# Patient Record
Sex: MALE | Race: ASIAN | NOT HISPANIC OR LATINO | Employment: PART TIME | URBAN - METROPOLITAN AREA
[De-identification: names, ages, dates, MRNs, and addresses within clinical notes are randomized per-mention and may not be internally consistent; named-entity substitution may affect disease eponyms.]

---

## 2020-10-15 ENCOUNTER — OFFICE VISIT (OUTPATIENT)
Dept: FAMILY MEDICINE | Facility: CLINIC | Age: 50
End: 2020-10-15

## 2020-10-15 ENCOUNTER — PATIENT OUTREACH (OUTPATIENT)
Dept: CARE COORDINATION | Facility: CLINIC | Age: 50
End: 2020-10-15

## 2020-10-15 VITALS
DIASTOLIC BLOOD PRESSURE: 66 MMHG | HEART RATE: 80 BPM | TEMPERATURE: 97.4 F | RESPIRATION RATE: 18 BRPM | OXYGEN SATURATION: 98 % | SYSTOLIC BLOOD PRESSURE: 139 MMHG | WEIGHT: 178 LBS

## 2020-10-15 DIAGNOSIS — E11.9 TYPE 2 DIABETES MELLITUS WITHOUT COMPLICATION, WITHOUT LONG-TERM CURRENT USE OF INSULIN (H): Primary | ICD-10-CM

## 2020-10-15 LAB
ALBUMIN SERPL-MCNC: 4.2 G/DL (ref 3.4–5)
ALP SERPL-CCNC: 58 U/L (ref 40–150)
ALT SERPL W P-5'-P-CCNC: 35 U/L (ref 0–70)
ANION GAP SERPL CALCULATED.3IONS-SCNC: 11 MMOL/L (ref 3–14)
AST SERPL W P-5'-P-CCNC: 26 U/L (ref 0–45)
BILIRUB SERPL-MCNC: 1 MG/DL (ref 0.2–1.3)
BUN SERPL-MCNC: 13 MG/DL (ref 7–30)
CALCIUM SERPL-MCNC: 9 MG/DL (ref 8.5–10.1)
CHLORIDE SERPL-SCNC: 106 MMOL/L (ref 94–109)
CHOLEST SERPL-MCNC: 86 MG/DL
CO2 SERPL-SCNC: 20 MMOL/L (ref 20–32)
CREAT SERPL-MCNC: 0.68 MG/DL (ref 0.66–1.25)
GFR SERPL CREATININE-BSD FRML MDRD: >90 ML/MIN/{1.73_M2}
GLUCOSE SERPL-MCNC: 169 MG/DL (ref 70–99)
HBA1C MFR BLD: 6.6 % (ref 0–5.6)
HDLC SERPL-MCNC: 38 MG/DL
LDLC SERPL CALC-MCNC: 29 MG/DL
NONHDLC SERPL-MCNC: 48 MG/DL
POTASSIUM SERPL-SCNC: 3.7 MMOL/L (ref 3.4–5.3)
PROT SERPL-MCNC: 8.1 G/DL (ref 6.8–8.8)
SODIUM SERPL-SCNC: 137 MMOL/L (ref 133–144)
TRIGL SERPL-MCNC: 95 MG/DL

## 2020-10-15 PROCEDURE — 36415 COLL VENOUS BLD VENIPUNCTURE: CPT | Performed by: PHYSICIAN ASSISTANT

## 2020-10-15 PROCEDURE — 83036 HEMOGLOBIN GLYCOSYLATED A1C: CPT | Performed by: PHYSICIAN ASSISTANT

## 2020-10-15 PROCEDURE — 80053 COMPREHEN METABOLIC PANEL: CPT | Performed by: PHYSICIAN ASSISTANT

## 2020-10-15 PROCEDURE — 80061 LIPID PANEL: CPT | Performed by: PHYSICIAN ASSISTANT

## 2020-10-15 PROCEDURE — 99203 OFFICE O/P NEW LOW 30 MIN: CPT | Performed by: PHYSICIAN ASSISTANT

## 2020-10-15 RX ORDER — ATORVASTATIN CALCIUM 10 MG/1
10 TABLET, FILM COATED ORAL DAILY
Qty: 90 TABLET | Refills: 3 | Status: SHIPPED | OUTPATIENT
Start: 2020-10-15 | End: 2021-04-28

## 2020-10-15 RX ORDER — ATORVASTATIN CALCIUM 10 MG/1
10 TABLET, FILM COATED ORAL DAILY
COMMUNITY
End: 2021-04-28

## 2020-10-15 SDOH — HEALTH STABILITY: MENTAL HEALTH: HOW OFTEN DO YOU HAVE 6 OR MORE DRINKS ON ONE OCCASION?: NEVER

## 2020-10-15 SDOH — HEALTH STABILITY: MENTAL HEALTH: HOW OFTEN DO YOU HAVE A DRINK CONTAINING ALCOHOL?: NEVER

## 2020-10-15 SDOH — HEALTH STABILITY: MENTAL HEALTH: HOW MANY STANDARD DRINKS CONTAINING ALCOHOL DO YOU HAVE ON A TYPICAL DAY?: NOT ASKED

## 2020-10-15 NOTE — PROGRESS NOTES
Subjective     Lisa Cazares is a 50 year old male who presents to clinic today for the following health issues:    HPI   Diabetes Follow-up      How often are you checking your blood sugar? Once a week     What concerns do you have today about your diabetes? None     Do you have any of these symptoms? (Select all that apply)  No numbness or tingling in feet.  No redness, sores or blisters on feet.  No complaints of excessive thirst.  No reports of blurry vision.  No significant changes to weight.     Financial constraints. No insurance currently. Requesting year supply of medications.       BP Readings from Last 2 Encounters:   10/15/20 139/66     Hemoglobin A1C (%)   Date Value   10/15/2020 6.6 (H)     LDL Cholesterol Calculated (mg/dL)   Date Value   10/15/2020 29               Review of Systems   Constitutional, HEENT, cardiovascular, pulmonary, gi and gu systems are negative, except as otherwise noted.      Objective    /66   Pulse 80   Temp 97.4  F (36.3  C) (Oral)   Resp 18   Wt 80.7 kg (178 lb)   SpO2 98%   There is no height or weight on file to calculate BMI.  Physical Exam  Vitals signs and nursing note reviewed.   Constitutional:       Appearance: Normal appearance.   HENT:      Head: Normocephalic and atraumatic.   Eyes:      Conjunctiva/sclera: Conjunctivae normal.   Cardiovascular:      Rate and Rhythm: Normal rate and regular rhythm.      Heart sounds: Normal heart sounds.   Pulmonary:      Effort: Pulmonary effort is normal.      Breath sounds: Normal breath sounds.   Skin:     General: Skin is warm and dry.   Neurological:      Mental Status: He is alert and oriented to person, place, and time.   Psychiatric:         Mood and Affect: Mood normal.         Behavior: Behavior normal.            Results for orders placed or performed in visit on 10/15/20   Lipid panel reflex to direct LDL Fasting     Status: Abnormal   Result Value Ref Range    Cholesterol 86 <200 mg/dL    Triglycerides 95  <150 mg/dL    HDL Cholesterol 38 (L) >39 mg/dL    LDL Cholesterol Calculated 29 <100 mg/dL    Non HDL Cholesterol 48 <130 mg/dL   Hemoglobin A1c     Status: Abnormal   Result Value Ref Range    Hemoglobin A1C 6.6 (H) 0 - 5.6 %   Comprehensive metabolic panel (BMP + Alb, Alk Phos, ALT, AST, Total. Bili, TP)     Status: Abnormal   Result Value Ref Range    Sodium 137 133 - 144 mmol/L    Potassium 3.7 3.4 - 5.3 mmol/L    Chloride 106 94 - 109 mmol/L    Carbon Dioxide 20 20 - 32 mmol/L    Anion Gap 11 3 - 14 mmol/L    Glucose 169 (H) 70 - 99 mg/dL    Urea Nitrogen 13 7 - 30 mg/dL    Creatinine 0.68 0.66 - 1.25 mg/dL    GFR Estimate >90 >60 mL/min/[1.73_m2]    GFR Estimate If Black >90 >60 mL/min/[1.73_m2]    Calcium 9.0 8.5 - 10.1 mg/dL    Bilirubin Total 1.0 0.2 - 1.3 mg/dL    Albumin 4.2 3.4 - 5.0 g/dL    Protein Total 8.1 6.8 - 8.8 g/dL    Alkaline Phosphatase 58 40 - 150 U/L    ALT 35 0 - 70 U/L    AST 26 0 - 45 U/L           Assessment & Plan   Problem List Items Addressed This Visit     None      Visit Diagnoses     Type 2 diabetes mellitus without complication, without long-term current use of insulin (H)    -  Primary    Relevant Medications    metFORMIN (GLUCOPHAGE) 1000 MG tablet    metFORMIN (GLUCOPHAGE) 500 MG tablet    atorvastatin (LIPITOR) 10 MG tablet    Other Relevant Orders    CARE COORDINATION REFERRAL    Lipid panel reflex to direct LDL Fasting (Completed)    Hemoglobin A1c (Completed)    **A1C FUTURE 3mo    Comprehensive metabolic panel (BMP + Alb, Alk Phos, ALT, AST, Total. Bili, TP) (Completed)        Had been taking Metformin 1000 mg total dose daily  Will send in new script for Metformin 500 mg BID   Would like him to come back for lab recheck in 3 months  There are financial constraints/insurance issues. Referral to care coordination. Appreciate their recs to help patient with cost of health care.   A1c and cholesterol at goal today     Return in about 3 months (around 1/15/2021) for Lab  Work.    Mark Peter PA-C  [facility]

## 2020-10-15 NOTE — LETTER
October 16, 2020      Lisa Cazares  [address]  [place] [place] [address]        Dear ,    Thanks for coming in for your recent visit.   Your cholesterol looks excellent. Continue healthy lifestyle and the statin medication.   Your comprehensive metabolic panel (kidney function, liver function, glucose, electrolytes) is normal save for the elevated glucose which we would expect given your diabetes history.   Your hemoglobin A1c returned at goal at 6.6%. Keep up the good work. We will have you continue the Metformin as prescribed and follow up in 3 months with lab recheck.   Please be in touch with any questions.       Resulted Orders   Lipid panel reflex to direct LDL Fasting   Result Value Ref Range    Cholesterol 86 <200 mg/dL    Triglycerides 95 <150 mg/dL      Comment:      Fasting specimen    HDL Cholesterol 38 (L) >39 mg/dL    LDL Cholesterol Calculated 29 <100 mg/dL      Comment:      Desirable:       <100 mg/dl    Non HDL Cholesterol 48 <130 mg/dL   Hemoglobin A1c   Result Value Ref Range    Hemoglobin A1C 6.6 (H) 0 - 5.6 %      Comment:      Normal <5.7% Prediabetes 5.7-6.4%  Diabetes 6.5% or higher - adopted from ADA   consensus guidelines.     Comprehensive metabolic panel (BMP + Alb, Alk Phos, ALT, AST, Total. Bili, TP)   Result Value Ref Range    Sodium 137 133 - 144 mmol/L    Potassium 3.7 3.4 - 5.3 mmol/L    Chloride 106 94 - 109 mmol/L    Carbon Dioxide 20 20 - 32 mmol/L    Anion Gap 11 3 - 14 mmol/L    Glucose 169 (H) 70 - 99 mg/dL      Comment:      Fasting specimen    Urea Nitrogen 13 7 - 30 mg/dL    Creatinine 0.68 0.66 - 1.25 mg/dL    GFR Estimate >90 >60 mL/min/[1.73_m2]      Comment:      Non  GFR Calc  Starting 12/18/2018, serum creatinine based estimated GFR (eGFR) will be   calculated using the Chronic Kidney Disease Epidemiology Collaboration   (CKD-EPI) equation.      GFR Estimate If Black >90 >60 mL/min/[1.73_m2]      Comment:       GFR  Calc  Starting 12/18/2018, serum creatinine based estimated GFR (eGFR) will be   calculated using the Chronic Kidney Disease Epidemiology Collaboration   (CKD-EPI) equation.      Calcium 9.0 8.5 - 10.1 mg/dL    Bilirubin Total 1.0 0.2 - 1.3 mg/dL    Albumin 4.2 3.4 - 5.0 g/dL    Protein Total 8.1 6.8 - 8.8 g/dL    Alkaline Phosphatase 58 40 - 150 U/L    ALT 35 0 - 70 U/L    AST 26 0 - 45 U/L       If you have any questions or concerns, please call the clinic at the number listed above.       Sincerely,        Mark Peter PA-C

## 2020-10-15 NOTE — LETTER
October 19, 2020      Lisa gabriel Debora  [address]  [place] [facility] [address]        Hi Lisa Gabriel,     Thanks for coming in for your recent visit.   Your cholesterol looks excellent. Continue healthy lifestyle and the statin medication.   Your comprehensive metabolic panel (kidney function, liver function, glucose, electrolytes) is normal save for the elevated glucose which we would expect given your diabetes history.   Your hemoglobin A1c returned at goal at 6.6%. Keep up the good work. We will have you continue the Metformin as prescribed and follow up in 3 months with lab recheck.   Please be in touch with any questions.     Resulted Orders   Lipid panel reflex to direct LDL Fasting   Result Value Ref Range    Cholesterol 86 <200 mg/dL    Triglycerides 95 <150 mg/dL      Comment:      Fasting specimen    HDL Cholesterol 38 (L) >39 mg/dL    LDL Cholesterol Calculated 29 <100 mg/dL      Comment:      Desirable:       <100 mg/dl    Non HDL Cholesterol 48 <130 mg/dL   Hemoglobin A1c   Result Value Ref Range    Hemoglobin A1C 6.6 (H) 0 - 5.6 %      Comment:      Normal <5.7% Prediabetes 5.7-6.4%  Diabetes 6.5% or higher - adopted from ADA   consensus guidelines.     Comprehensive metabolic panel (BMP + Alb, Alk Phos, ALT, AST, Total. Bili, TP)   Result Value Ref Range    Sodium 137 133 - 144 mmol/L    Potassium 3.7 3.4 - 5.3 mmol/L    Chloride 106 94 - 109 mmol/L    Carbon Dioxide 20 20 - 32 mmol/L    Anion Gap 11 3 - 14 mmol/L    Glucose 169 (H) 70 - 99 mg/dL      Comment:      Fasting specimen    Urea Nitrogen 13 7 - 30 mg/dL    Creatinine 0.68 0.66 - 1.25 mg/dL    GFR Estimate >90 >60 mL/min/[1.73_m2]      Comment:      Non  GFR Calc  Starting 12/18/2018, serum creatinine based estimated GFR (eGFR) will be   calculated using the Chronic Kidney Disease Epidemiology Collaboration   (CKD-EPI) equation.      GFR Estimate If Black >90 >60 mL/min/[1.73_m2]      Comment:       GFR  Calc  Starting 12/18/2018, serum creatinine based estimated GFR (eGFR) will be   calculated using the Chronic Kidney Disease Epidemiology Collaboration   (CKD-EPI) equation.      Calcium 9.0 8.5 - 10.1 mg/dL    Bilirubin Total 1.0 0.2 - 1.3 mg/dL    Albumin 4.2 3.4 - 5.0 g/dL    Protein Total 8.1 6.8 - 8.8 g/dL    Alkaline Phosphatase 58 40 - 150 U/L    ALT 35 0 - 70 U/L    AST 26 0 - 45 U/L     Thanks,       Mark Peter

## 2020-10-19 NOTE — PROGRESS NOTES
Clinic Care Coordination Contact  Community Health Worker Initial Outreach        Patient accepts CC: Yes. Patient scheduled for assessment with Coretta on 10/21/2020 at 10am by phone. Patient noted desire to discuss Insurance and other resources.     Patient has been here for 6 years. Working on [place]ylum. Does have a social security number to work.    Referral from PCP for Insurance. Explained we would have an intake for CCC to enroll and then will determine next steps

## 2020-10-21 ENCOUNTER — PATIENT OUTREACH (OUTPATIENT)
Dept: CARE COORDINATION | Facility: CLINIC | Age: 50
End: 2020-10-21
Attending: PHYSICIAN ASSISTANT

## 2020-10-21 SDOH — ECONOMIC STABILITY: FOOD INSECURITY: WITHIN THE PAST 12 MONTHS, YOU WORRIED THAT YOUR FOOD WOULD RUN OUT BEFORE YOU GOT MONEY TO BUY MORE.: NEVER TRUE

## 2020-10-21 SDOH — ECONOMIC STABILITY: FOOD INSECURITY: WITHIN THE PAST 12 MONTHS, THE FOOD YOU BOUGHT JUST DIDN'T LAST AND YOU DIDN'T HAVE MONEY TO GET MORE.: NEVER TRUE

## 2020-10-21 SDOH — ECONOMIC STABILITY: INCOME INSECURITY: HOW HARD IS IT FOR YOU TO PAY FOR THE VERY BASICS LIKE FOOD, HOUSING, MEDICAL CARE, AND HEATING?: SOMEWHAT HARD

## 2020-10-21 SDOH — ECONOMIC STABILITY: TRANSPORTATION INSECURITY
IN THE PAST 12 MONTHS, HAS THE LACK OF TRANSPORTATION KEPT YOU FROM MEDICAL APPOINTMENTS OR FROM GETTING MEDICATIONS?: NO

## 2020-10-21 SDOH — ECONOMIC STABILITY: TRANSPORTATION INSECURITY
IN THE PAST 12 MONTHS, HAS LACK OF TRANSPORTATION KEPT YOU FROM MEETINGS, WORK, OR FROM GETTING THINGS NEEDED FOR DAILY LIVING?: NO

## 2020-10-21 ASSESSMENT — ACTIVITIES OF DAILY LIVING (ADL): DEPENDENT_IADLS:: INDEPENDENT

## 2020-10-21 NOTE — PROGRESS NOTES
Clinic Care Coordination Contact    Clinic Care Coordination Contact  OUTREACH    Referral Information:  Referral Source: PCP    Assessment:  completed initial assessment with , patient and his friend.  Patient shares that he has just moved here from [place] and is under political asylum and working with a  to get citizenship.  Patient does not have any family that live close but has a few good friends who are helping him with a place to live and transportation and food.    Patient works part-time and has a monthly income of $1,100    Patient reports being up to date with medical issues and does not need help scheduling.   Patient's main concern is getting medical insurance as he is without. Also, any financial support would be helpful. Patient has a passport and social security #.   informed patient and friend that they will send referral to FRW to see if patient is eligible for medical insurance or financial help.           Chief Complaint   Patient presents with     Clinic Care Coordination - Initial        Universal Utilization:   Clinic Utilization  Difficulty keeping appointments:: No  Compliance Concerns: No  No-Show Concerns: No  No PCP office visit in Past Year: No  Utilization    Last refreshed: 10/19/2020  2:24 PM: Hospital Admissions 0           Last refreshed: 10/19/2020  2:24 PM: ED Visits 0           Last refreshed: 10/19/2020  2:24 PM: No Show Count (past year) 0              Current as of: 10/19/2020  2:24 PM              Clinical Concerns:  Current Medical Concerns:   Diabetes  Current Behavioral Concerns: NA   Education Provided to patient: Care Coordination  Pain  Pain (GOAL):: No  Health Maintenance Reviewed: Up to date  Clinical Pathway: None    Medication Management:  Managed independently-no concerns    Functional Status:  Dependent ADLs:: Independent  Dependent IADLs:: Independent  Bed or wheelchair confined:: No  Mobility Status:  Independent  Fallen 2 or more times in the past year?: No  Any fall with injury in the past year?: No    Living Situation:  Current living arrangement:: I live in a private home  Type of residence:: Private home - stairs    Lifestyle & Psychosocial Needs:     Social Needs     Financial resource strain: Somewhat hard     Food insecurity     Worry: Never true     Inability: Never true     Transportation needs     Medical: No     Non-medical: No     Diet:: Diabetic diet  Inadequate nutrition (GOAL):: No  Tube Feeding: No  Inadequate activity/exercise (GOAL):: No  Significant changes in sleep pattern (GOAL): No  Transportation means:: Friend     Caodaism or spiritual beliefs that impact treatment:: No  Mental health DX:: No  Mental health management concern (GOAL):: No  Chemical Dependency Status: (No concerns)  Informal Support system:: Friends   Socioeconomic History     Marital status: Single     Spouse name: Not on file     Number of children: Not on file     Years of education: Not on file     Highest education level: Not on file     Tobacco Use     Smoking status: Never Smoker     Smokeless tobacco: Never Used   Substance and Sexual Activity     Alcohol use: Never     Frequency: Never     Binge frequency: Never     Drug use: Never   Resources and Interventions:  Current Resources:     -Referral [facility] to [facility]     Community Resources: None  Supplies used at home:: Diabetic Supplies  Equipment Currently Used at Home: none  Employment Status: employed part-time)   )    Advance Care Plan/Directive  Advanced Care Plans/Directives on file:: Yes  Advanced Care Plan/Directive Status: Considering Options    Referrals Placed: Financial Services     Goals:   Goals        General    Psychosocial (pt-stated)     Notes - Note created  10/21/2020 10:36 AM by Coretta Daly LSW    Goal Statement: I would like to see if I qualify for medical insurance through the [facility] in the next 2 months  Date Goal set: 10/21/20  Barriers:  Language  Strengths: Motivated  Date to Achieve By: 2 months  Patient expressed understanding of goal: Yes  Action steps to achieve this goal:  1.  will place referral with FRW  2. I will answer when FRW calls              Patient/Caregiver understanding: Yes       Future Appointments              In 2 months [facility] LAB [facility] [place]          Plan:   -Referral sent to FRW for [place].  - will follow up with patient in one month

## 2020-10-22 ENCOUNTER — PATIENT OUTREACH (OUTPATIENT)
Dept: CARE COORDINATION | Facility: CLINIC | Age: 50
End: 2020-10-22

## 2020-10-22 NOTE — PROGRESS NOTES
Clinic Care Coordination Contact  Program: Health Insurance, SNAP/CASH  County: [facility] Case #:  [facility] Worker:   Shahab #:   PMI #:   Renewal Month:  Date Applied:     FRW Outreach:  10/22/2020: Outreach attempted x 1. Left message on voicemail with call back information and requested return call.  Plan: FRW will call again within one week.     Health Insurance:    None    Referral/Screening:      Felipe Garcia,     Patient is needing medical insurance and any financial help he may be eligible for.     -Monthly income $1,100   -Needs cantonese    -Under political asylum, working with  on citizenship   -Has passport and social security #     Thanks much!   Coretta

## 2020-10-29 ENCOUNTER — PATIENT OUTREACH (OUTPATIENT)
Dept: CARE COORDINATION | Facility: CLINIC | Age: 50
End: 2020-10-29

## 2020-10-29 NOTE — PROGRESS NOTES
Clinic Care Coordination Contact  Program: Health Insurance, SNAP/CASH [place]   County: [facility] Case #: [id number]  [facility] Worker:   Shahab #:   PMI #:   Renewal Month:  Date Applied:     FRW Outreach:  10/29/2020: FRW called patient and went thorough the [place] screening. Patient states his friend helped him apply earlier this week but online but he didn't get a response. FRW finished most of the screening questions and advised patient we were going to call [facility] to see if there's an application on file. FRW called [facility] with patient and our call was disconnected. FRW called [employer] back and talked to Anais, she states he applied on 10/1/20 and was approved for [employer]. FRW called patient back and advised of this. Patient will call [employer] with his friend as he never received information in the mail. FRW gave them the phone number and advised if they aren't successful I can assist them next week as patient's chart states he speaks Cantonese but he actually speaks [place]. I'm not able to call [employer] with patient today as we don't have access to the correct language and he will need to be on the phone to give permission.     Closed Encounters:  10/22/2020: Outreach attempted x 1. Left message on voicemail with call back information and requested return call.  Plan: FRW will call again within one week.      Health Insurance Screening: [employer]URE   1. Do you currently have health insurance, did you receive a renewal?   2. If you applied through Mns[employer], do you know your username/password? Yes, he applied. His friend helped him online.    3. How many people in the household? 1  4. Do you file taxes, who do you file with? Yes  5. What is your monthly income (include all tax members)? Makes about $1,000  6. Do you have access to insurance through an employer (if yes, need EIN)?  7. Do you have social security cards and/or green cards? No, has a work permit      Health  Insurance:    None    Referral/Screening:      Felipe Garcia,     Patient is needing medical insurance and any financial help he may be eligible for.     -Monthly income $1,100   -Needs cantonese    -Under political asylum, working with  on citizenship   -Has passport and social security #     Thanks much!   Coretta

## 2020-11-05 ENCOUNTER — PATIENT OUTREACH (OUTPATIENT)
Dept: CARE COORDINATION | Facility: CLINIC | Age: 50
End: 2020-11-05

## 2020-11-05 NOTE — PROGRESS NOTES
Clinic Care Coordination Contact  Program: Health Insurance, SNAP/CASH [place]   County: [facility] Case #: [id number]  [facility] Worker:   Shahab #:   PMI #: [id number]  Renewal Month:  Date Applied:     FRW Outreach:  11/5/2020: FRW called patient x3 as the voicemail sounded like a female saying Jodi.  left a vm. FRW checked [place] and found active [employer] as of 11/1/20. FRW will make outreach to patient in 1 week.     Closed Encounters:  10/29/2020: FRW called patient and went thorough the MNsure screening. Patient states his friend helped him apply earlier this week but online but he didn't get a response. FRW finished most of the screening questions and advised patient we were going to call [facility] to see if there's an application on file. FRW called [facility] with patient and our call was disconnected. FRW called [employer] back and talked to Anais, she states he applied on 10/1/20 and was approved for [employer]. FRW called patient back and advised of this. Patient will call [employer] with his friend as he never received information in the mail. FRW gave them the phone number and advised if they aren't successful I can assist them next week as patient's chart states he speaks Cantonese but he actually speaks [place]. I'm not able to call [employer] with patient today as we don't have access to the correct language and he will need to be on the phone to give permission.  10/22/2020: Outreach attempted x 1. Left message on voicemail with call back information and requested return call.  Plan: FRW will call again within one week.      Health Insurance Screening: MNSURE   1. Do you currently have health insurance, did you receive a renewal?   2. If you applied through Mnsure, do you know your username/password? Yes, he applied. His friend helped him online.    3. How many people in the household? 1  4. Do you file taxes, who do you file with? Yes  5. What is your monthly income (include all tax members)? Makes about  $1,000  6. Do you have access to insurance through an employer (if yes, need EIN)?  7. Do you have social security cards and/or green cards? No, has a work permit      Health Insurance:    Major Programs  This subscriber is eligible for BB: [place] [employer].  Elig Type M5: [employer]  Eligibility Begin Date: 11/01/2020  Eligibility End Date: --/--/----  This subscriber is eligible for the following service types: Medical Care ,  Chiropractic ,  Dental Care ,  Hospital ,  Hospital - Inpatient ,  Hospital - Outpatient ,  Emergency Services ,  Pharmacy ,  Professional (Physician) Visit - Office ,  Vision (Optometry) ,  Mental Health ,  Urgent Care  Prepaid Health Plan  This subscriber receives BB01 - [place] delivered through [facility]. The phone numbers is [phone] ().    Referral/Screening:      Felipe Garcia,     Patient is needing medical insurance and any financial help he may be eligible for.     -Monthly income $1,100   -Needs cantonese    -Under political asylum, working with  on citizenship   -Has passport and social security #     Thanks much!   Coretta

## 2020-11-12 ENCOUNTER — PATIENT OUTREACH (OUTPATIENT)
Dept: CARE COORDINATION | Facility: CLINIC | Age: 50
End: 2020-11-12

## 2020-11-12 NOTE — PROGRESS NOTES
Clinic Care Coordination Contact  Program: Health Insurance, SNAP/CASH [place]   County: [facility] Case #: [id number]  [facility] Worker:   Shahab #:   PMI #: [id number]  Renewal Month:  Date Applied:     FRW Outreach:  11/12/2020: FRW called patient and left a vm with call back information. FRW will make outreach in 1 week.     Closed Encounters:  11/5/2020: FRW called patient x3 as the voicemail sounded like a female saying Jodi.  left a vm. FRW checked [place] and found active [employer] as of 11/1/20. FRW will make outreach to patient in 1 week.   10/29/2020: FRW called patient and went thorough the [employer]ure screening. Patient states his friend helped him apply earlier this week but online but he didn't get a response. FRW finished most of the screening questions and advised patient we were going to call [facility] to see if there's an application on file. FRW called [facility] with patient and our call was disconnected. FRW called [employer] back and talked to Anais, she states he applied on 10/1/20 and was approved for [employer]. FRW called patient back and advised of this. Patient will call [employer] with his friend as he never received information in the mail. FRW gave them the phone number and advised if they aren't successful I can assist them next week as patient's chart states he speaks Cantonese but he actually speaks [place]. I'm not able to call [employer] with patient today as we don't have access to the correct language and he will need to be on the phone to give permission.  10/22/2020: Outreach attempted x 1. Left message on voicemail with call back information and requested return call.  Plan: FRW will call again within one week.      Health Insurance Screening: MNSURE   1. Do you currently have health insurance, did you receive a renewal?   2. If you applied through Mnsure, do you know your username/password? Yes, he applied. His friend helped him online.    3. How many people in the household? 1  4. Do you  file taxes, who do you file with? Yes  5. What is your monthly income (include all tax members)? Makes about $1,000  6. Do you have access to insurance through an employer (if yes, need EIN)?  7. Do you have social security cards and/or green cards? No, has a work permit      Health Insurance:    Major Programs  This subscriber is eligible for BB: [place] [employer].  Elig Type M5: [employer]  Eligibility Begin Date: 11/01/2020  Eligibility End Date: --/--/----  This subscriber is eligible for the following service types: Medical Care ,  Chiropractic ,  Dental Care ,  Hospital ,  Hospital - Inpatient ,  Hospital - Outpatient ,  Emergency Services ,  Pharmacy ,  Professional (Physician) Visit - Office ,  Vision (Optometry) ,  Mental Health ,  Urgent Care  Prepaid Health Plan  This subscriber receives BB01 - [place] delivered through [employer]. The phone numbers is [phone] ().    Referral/Screening:      Felipe Garcia,     Patient is needing medical insurance and any financial help he may be eligible for.     -Monthly income $1,100   -Needs cantonese    -Under political asylum, working with  on citizenship   -Has passport and social security #     Thanks much!   Coretta     Financial Resource Worker Follow Up    Goals:       Intervention and Education during outreach: n/a    FRW Plan: FRW will make outreach in 1 week

## 2020-11-18 ENCOUNTER — PATIENT OUTREACH (OUTPATIENT)
Dept: CARE COORDINATION | Facility: CLINIC | Age: 50
End: 2020-11-18

## 2020-11-18 NOTE — PROGRESS NOTES
Clinic Care Coordination Contact  Program: Health Insurance, SNAP/CASH [place]   County: [facility] Case #: [id number]  [facility] Worker:   Shahab #:   PMI #: [id number]  Renewal Month:  Date Applied:     FRW Outreach:  11/18/2020: FRW called patient and left a vm with call back information. FRW will make one more outreach in 1 week.     Closed Encounters:  11/12/2020: FRW called patient and left a vm with call back information. FRW will make outreach in 1 week.  11/5/2020: FRW called patient x3 as the voicemail sounded like a female saying Jodi.  left a vm. FRW [place] and [place] as of 11/1/20. FRW will make outreach to patient in 1 week.   10/29/2020: FRW called patient and went thorough the M[place] screening. Patient states his friend helped him apply earlier this week but online but he didn't get a response. FRW finished most of the screening questions and advised patient we were going to call [facility] to see if there's an application on file. FRW called [facility] with patient and our call was disconnected. FRW called [employer] back and talked to Anais, she states he applied on 10/1/20 and was approved for [place]are. FRW called patient back and advised of this. Patient will call M[place]are with his friend as he never received information in the mail. FRW gave them the phone number and advised if they aren't successful I can assist them next week as patient's chart states he speaks Cantonese but he actually speaks Mandrin. I'm not able to call [place] with patient today as we don't have access to the correct language and he will need to be on the phone to give permission.  10/22/2020: Outreach attempted x 1. Left message on voicemail with call back information and requested return call.  Plan: FRW will call again within one week.      Health Insurance Screening: MNSURE   1. Do you currently have health insurance, did you receive a renewal?   2. If you applied through Mnsure, do you know your  username/password? Yes, he applied. His friend helped him online.    3. How many people in the household? 1  4. Do you file taxes, who do you file with? Yes  5. What is your monthly income (include all tax members)? Makes about $1,000  6. Do you have access to insurance through an employer (if yes, need EIN)?  7. Do you have social security cards and/or green cards? No, has a work permit      Health Insurance:    Major Programs  This subscriber is eligible for BB: [place] [employer].  Shikha Type M5: [employer] V  Eligibility Begin Date: 11/01/2020  Eligibility End Date: --/--/----  This subscriber is eligible for the following service types: Medical Care ,  Chiropractic ,  Dental Care ,  Hospital ,  Hospital - Inpatient ,  Hospital - Outpatient ,  Emergency Services ,  Pharmacy ,  Professional (Physician) Visit - Office ,  Vision (Optometry) ,  Mental Health ,  Urgent Care  Prepaid Health Plan  This subscriber receives BB01 - [facility] delivered through [employer]. The phone numbers is [phone] ().    Referral/Screening:      Felipe Garcia,     Patient is needing medical insurance and any financial help he may be eligible for.     -Monthly income $1,100   -Needs cantonese    -Under political asylum, working with  on citizenship   -Has passport and social security #     Thanks much!   Coretta     Financial Resource Worker Follow Up    Goals:       Intervention and Education during outreach: n/a    FRW Plan: FRW will make one more outreach in 1 week

## 2020-11-24 ENCOUNTER — PATIENT OUTREACH (OUTPATIENT)
Dept: CARE COORDINATION | Facility: CLINIC | Age: 50
End: 2020-11-24

## 2020-11-24 NOTE — PROGRESS NOTES
Clinic Care Coordination Contact    Situation: Patient chart reviewed by care coordinator.    Background: Pt enrolled in Care Coordination in October 2020.      Assessment: Consult with FRMASOOD Garcia. Radha noted MANDARIN interp is needed no Cantonese.  FRW actively working with pt on insurance (see FRW notes).  Currently having difficulty reaching pt to inform him insurance is active and monthly premiums for M[place]are will need to be paid to keep insurance active.  FRW to inquire about assisting SNAP/Cash application as well.     Plan/Recommendations: CC SW to review every 45 days and available for consult with FRW when needed    No CHW outreach at this time.

## 2020-11-25 ENCOUNTER — PATIENT OUTREACH (OUTPATIENT)
Dept: CARE COORDINATION | Facility: CLINIC | Age: 50
End: 2020-11-25

## 2020-11-25 NOTE — PROGRESS NOTES
Clinic Care Coordination Contact  Program: Health Insurance, SNAP/CASH [place]   County: [facility] Case #: [id number]  [facility] Worker:   Mnsure #:   PMI #: [id number]  Renewal Month:  Date Applied:     FRW Outreach:  11/25/2020: FRW called patient and advised him he has active [place]. Patient states he knows he has insurance now. FRW discussed applying for [facility] benefits with patient and he states his friend is currently helping him out so he isn't interested at this time. FRW will remove patient from panel, resolve the FRW episode and complete the financial goal. Please refer to FRW for future needs.     Closed Encounters:  11/18/2020: FRW called patient and left a vm with call back information. FRW will make one more outreach in 1 week.   11/12/2020: FRW called patient and left a vm with call back information. FRW will make outreach in 1 week.  11/5/2020: FRW called patient x3 as the voicemail sounded like a female saying Jodi.  left a vm. FRW checked [place] and found [place] as of 11/1/20. FRW will make outreach to patient in 1 week.   10/29/2020: FRW called patient and went thorough the MNsure screening. Patient states his friend helped him apply earlier this week but online but he didn't get a response. FRW finished most of the screening questions and advised patient we were going to call [employer] to see if there's an application on file. FRW called [facility] with patient and our call was disconnected. FRW called [employer] back and talked to Anais, she states he applied on 10/1/20 and was approved for M[place]are. FRW called patient back and advised of this. Patient will call M[place]are with his friend as he never received information in the mail. FRW gave them the phone number and advised if they aren't successful I can assist them next week as patient's chart states he speaks Cantonese but he actually speaks [place]. I'm not able to call M[place]are with patient today as we don't have access to the  correct language and he will need to be on the phone to give permission.  10/22/2020: Outreach attempted x 1. Left message on [employer] with call back information and requested return call.  Plan: FRW will call again within one week.      Health Insurance Screening: MNSURE   1. Do you currently have health insurance, did you receive a renewal?   2. If you applied through Mnsure, do you know your username/password? Yes, he applied. His friend helped him online.    3. How many people in the household? 1  4. Do you file taxes, who do you file with? Yes  5. What is your monthly income (include all tax members)? Makes about $1,000  6. Do you have access to insurance through an employer (if yes, need EIN)?  7. Do you have social security cards and/or green cards? No, has a work permit      Health Insurance:    Major Programs  This subscriber is eligible for BB: [place].  Elig Type M5: [facility] FFP GROUP V  Eligibility Begin Date: 11/01/2020  Eligibility End Date: --/--/----  This subscriber is eligible for the following service types: Medical Care ,  Chiropractic ,  Dental Care ,  Hospital ,  Hospital - Inpatient ,  Hospital - Outpatient ,  Emergency Services ,  Pharmacy ,  Professional (Physician) Visit - Office ,  Vision (Optometry) ,  Mental Health ,  Urgent Care  Prepaid Health Plan  This subscriber receives BB01 - [facility] delivered through [employer]. The phone numbers is [phone] ().    Referral/Screening:      Felipe Garcia,     Patient is needing medical insurance and any financial help he may be eligible for.     -Monthly income $1,100   -Needs cantonese    -Under political asylum, working with  on citizenship   -Has [employer] and social security #     Thanks much!   Coretta     Financial Resource Worker Follow Up    Goals:       Intervention and Education during outreach: n/a    FRW Plan: n/a

## 2020-11-27 ENCOUNTER — PATIENT OUTREACH (OUTPATIENT)
Dept: CARE COORDINATION | Facility: CLINIC | Age: 50
End: 2020-11-27

## 2020-11-27 NOTE — PROGRESS NOTES
Clinic Care Coordination Contact  Care Team Conversations    Per consult with FRW pt has active insurance, declined assistance with SNAP/cash application.  FRW episode closed.      Assessment:  No other active goals. FRW was only goal identified during initial assessment        Plan:  Pt graduated from CC. PCP to refer in future if needed

## 2020-11-27 NOTE — LETTER
[place] [facility]ARE COORDINATION  [address]  [place] [address]    November 27, 2020    Lisa Cazares  [address]  [place] [address]    Dear Lisa gabriel,  Your Care Team congratulates you on your journey to maintain wellness. This document will help guide you on your journey to maintain a healthy lifestyle.  You can use this to help you overcome any barriers you may encounter.  If you should have any questions or concerns, you can contact the members of your Care Team or contact your Primary Care Clinic for assistance.     Health Maintenance  Health Maintenance Reviewed:      My Access Plan  Medical Emergency 911   Primary Clinic Line [facility] - [phone]   24 Hour Appointment Line [phone] or  [phone]) (toll-free)   24 Hour Nurse Line [phone] (toll-free)   Preferred Urgent Care     Preferred Hospital     Preferred Pharmacy [facility] [place] [facility] [address]     Behavioral Health Crisis Line The National Suicide Prevention Lifeline at 1-936.393.4240 or 911     My Care Team Members  Patient Care Team       Relationship Specialty Notifications Start End    Mark Peter PA-C PCP - General Physician Assistant  10/15/20     Phone: [phone] Fax: [phone]         [address] [place] [address]    Mark Peter PA-C Assigned PCP   9/18/20     Phone: [phone] Fax: [phone]         [address] [place] [address]              Goals       COMPLETED: Financial Wellbeing (pt-stated)      Goal Statement: I would like to see if I qualify for medical insurance through the [facility] in the next 2 months    Date Goal set: 10/21/20  Barriers: Language  Strengths: Motivated  Date to Achieve By: 2 months  Patient expressed understanding of goal: Yes    Action steps to achieve this goal:  1. My friend helped me apply for [place]are (completed)  2.  FRW will make 2x attempts to reach pt as per [facility] pt has active M[place]are and will need  to pay monthly premiums.      Updated 11/24/20                 It has been your Clinic Care Team's pleasure to work with you on your goals.    Regards,  Your Clinic Care Team

## 2021-01-12 DIAGNOSIS — E11.9 TYPE 2 DIABETES MELLITUS WITHOUT COMPLICATION, WITHOUT LONG-TERM CURRENT USE OF INSULIN (H): ICD-10-CM

## 2021-01-12 LAB — HBA1C MFR BLD: 7.3 % (ref 0–5.6)

## 2021-01-12 PROCEDURE — 36415 COLL VENOUS BLD VENIPUNCTURE: CPT | Performed by: PHYSICIAN ASSISTANT

## 2021-01-12 PROCEDURE — 83036 HEMOGLOBIN GLYCOSYLATED A1C: CPT | Performed by: PHYSICIAN ASSISTANT

## 2021-01-21 ENCOUNTER — OFFICE VISIT (OUTPATIENT)
Dept: FAMILY MEDICINE | Facility: CLINIC | Age: 51
End: 2021-01-21
Payer: COMMERCIAL

## 2021-01-21 VITALS
DIASTOLIC BLOOD PRESSURE: 77 MMHG | SYSTOLIC BLOOD PRESSURE: 135 MMHG | WEIGHT: 178 LBS | TEMPERATURE: 96.1 F | HEART RATE: 77 BPM | OXYGEN SATURATION: 96 %

## 2021-01-21 DIAGNOSIS — E11.9 TYPE 2 DIABETES MELLITUS WITHOUT COMPLICATION, WITHOUT LONG-TERM CURRENT USE OF INSULIN (H): Primary | ICD-10-CM

## 2021-01-21 DIAGNOSIS — Z11.4 SCREENING FOR HIV (HUMAN IMMUNODEFICIENCY VIRUS): ICD-10-CM

## 2021-01-21 DIAGNOSIS — R03.0 ELEVATED BLOOD PRESSURE READING WITHOUT DIAGNOSIS OF HYPERTENSION: ICD-10-CM

## 2021-01-21 DIAGNOSIS — M79.676 PAIN OF GREAT TOE, UNSPECIFIED LATERALITY: ICD-10-CM

## 2021-01-21 DIAGNOSIS — R80.9 MICROALBUMINURIA: ICD-10-CM

## 2021-01-21 DIAGNOSIS — Z11.59 NEED FOR HEPATITIS C SCREENING TEST: ICD-10-CM

## 2021-01-21 LAB
HCV AB SERPL QL IA: NONREACTIVE
HIV 1+2 AB+HIV1 P24 AG SERPL QL IA: NONREACTIVE

## 2021-01-21 PROCEDURE — 87389 HIV-1 AG W/HIV-1&-2 AB AG IA: CPT | Performed by: PHYSICIAN ASSISTANT

## 2021-01-21 PROCEDURE — 36415 COLL VENOUS BLD VENIPUNCTURE: CPT | Performed by: PHYSICIAN ASSISTANT

## 2021-01-21 PROCEDURE — 82043 UR ALBUMIN QUANTITATIVE: CPT | Performed by: PHYSICIAN ASSISTANT

## 2021-01-21 PROCEDURE — 99215 OFFICE O/P EST HI 40 MIN: CPT | Performed by: PHYSICIAN ASSISTANT

## 2021-01-21 PROCEDURE — 99207 PR FOOT EXAM NO CHARGE: CPT | Performed by: PHYSICIAN ASSISTANT

## 2021-01-21 PROCEDURE — 86803 HEPATITIS C AB TEST: CPT | Performed by: PHYSICIAN ASSISTANT

## 2021-01-21 PROCEDURE — 84550 ASSAY OF BLOOD/URIC ACID: CPT | Performed by: PHYSICIAN ASSISTANT

## 2021-01-21 RX ORDER — GABAPENTIN 100 MG/1
100 CAPSULE ORAL AT BEDTIME
Qty: 90 CAPSULE | Refills: 0 | Status: SHIPPED | OUTPATIENT
Start: 2021-01-21 | End: 2021-04-28

## 2021-01-21 NOTE — PROGRESS NOTES
Assessment & Plan     Type 2 diabetes mellitus without complication, without long-term current use of insulin (H)  - Last A1c at goal at 7.3% on 1/12/21. Has increased some since October 2020 (6.6%). Will increase dose of Metformin to 1000 mg BID.   - Having great toe pain at night L > R. Will draw uric acid today to check for gout. Sensation diminished left foot > right on exam today. Will also try course of gabapentin at bedtime.   - Due for eye exam. Referral to N. They will call to schedule.   - gabapentin (NEURONTIN) 100 MG capsule; Take 1 capsule (100 mg) by mouth At Bedtime  - FOOT EXAM  - Albumin Random Urine Quantitative with Creat Ratio  - EYE ADULT REFERRAL; Future  - metFORMIN (GLUCOPHAGE) 1000 MG tablet; Take 1 tablet (1,000 mg) by mouth 2 times daily (with meals)    Elevated blood pressure reading without diagnosis of hypertension  - Wanting home blood pressure monitor. Prescription sent to pharmacy.   - Home Blood Pressure Monitor Order for DME - ONLY FOR DME    Pain of great toe, unspecified laterality  - Differential includes neuropathy vs gout. They think Lisa Gabriel may have had gout in the past but not sure. Will check uric acid today. Start bedtime gabapentin.   - Uric acid    Screening for HIV (human immunodeficiency virus)  - HIV Antigen Antibody Combo    Need for hepatitis C screening test  - Hepatitis C antibody      Diagnosis or treatment significantly limited by social determinants of health - language barrier    40 minutes spent on the date of the encounter doing chart review, history and exam, documentation and further activities as noted above         There are no Patient Instructions on file for this visit.    Return in about 3 months (around 4/21/2021) for Routine Visit, Lab Work.    Mark Peter PA-C  [facility]    Subjective     Lisa gabriel is a 50 year old who presents to clinic today for the following health issues  accompanied by his friend:    ZACK    Patient declined phone      Diabetes follow up:  Has been taking 500 mg Metformin in the morning and 1000 mg in the evenings. A1c returned at 7.3% which is at goal but up from previous visit in October 2020.   Has noticed that his great toes are painful at nighttime. Wondering about gout. Thinks he might have history of this.     HM: declines all immunizations today.       Review of Systems   Constitutional, HEENT, cardiovascular, pulmonary, gi and gu systems are negative, except as otherwise noted.      Objective    /77 (BP Location: Left arm, Patient Position: Sitting, Cuff Size: Adult Large)   Pulse 77   Temp 96.1  F (35.6  C) (Temporal)   Wt 80.7 kg (178 lb)   SpO2 96%   There is no height or weight on file to calculate BMI.  Physical Exam  Vitals signs and nursing note reviewed.   Constitutional:       Appearance: Normal appearance.   HENT:      Head: Normocephalic and atraumatic.   Cardiovascular:      Rate and Rhythm: Normal rate and regular rhythm.      Heart sounds: Normal heart sounds.   Pulmonary:      Effort: Pulmonary effort is normal.      Breath sounds: Normal breath sounds.   Feet:      Right foot:      Protective Sensation: 5 sites tested. 5 sites sensed.      Skin integrity: Skin integrity normal.      Left foot:      Protective Sensation: 5 sites tested. 3 sites sensed.      Skin integrity: Skin integrity normal.      Comments: No redness or warmth  Skin:     General: Skin is warm and dry.   Neurological:      Mental Status: He is alert and oriented to person, place, and time. Mental status is at baseline.   Psychiatric:         Mood and Affect: Mood normal.         Behavior: Behavior normal.            No results found for this or any previous visit (from the past 24 hour(s)).    Mark Peter PA-C

## 2021-01-21 NOTE — PROGRESS NOTES
"  {PROVIDER CHARTING PREFERENCE:011348}    Subjective     Lisa gabriel is a 50 year old who presents to clinic today for the following health issues {ACCOMPANIED BY STATEMENT (Optional):886338}    HPI       Diabetes Follow-up      How often are you checking your blood sugar? { :945181}    What concerns do you have today about your diabetes? { :014844::\"None\"}     Do you have any of these symptoms? (Select all that apply)  { :719188}    Have you had a diabetic eye exam in the last 12 months? { :684049}        {Reference  Diabetes Management Resources :971300}    {Reference  Diabetes Log - 7 days :453408}    Hyperlipidemia Follow-Up      Are you regularly taking any medication or supplement to lower your cholesterol?   { :630871}    Are you having muscle aches or other side effects that you think could be caused by your cholesterol lowering medication?  { :745234}    Hypertension Follow-up      Do you check your blood pressure regularly outside of the clinic? { :734431}     Are you following a low salt diet? { :197617}    Are your blood pressures ever more than 140 on the top number (systolic) OR more   than 90 on the bottom number (diastolic), for example 140/90? { :574029}    BP Readings from Last 2 Encounters:   10/15/20 139/66     Hemoglobin A1C (%)   Date Value   01/12/2021 7.3 (H)   10/15/2020 6.6 (H)     LDL Cholesterol Calculated (mg/dL)   Date Value   10/15/2020 29         How many servings of fruits and vegetables do you eat daily?  { :856313}    On average, how many sweetened beverages do you drink each day (Examples: soda, juice, sweet tea, etc.  Do NOT count diet or artificially sweetened beverages)?   { 1-11:079273}    How many days per week do you exercise enough to make your heart beat faster? { :400664}    How many minutes a day do you exercise enough to make your heart beat faster? { :944497}    How many days per week do you miss taking your medication? {0-7 :723213}    {additonal problems for provider " to add (Optional):928853}    Review of Systems   {ROS COMP (Optional):569951}      Objective    There were no vitals taken for this visit.  There is no height or weight on file to calculate BMI.  Physical Exam   {Exam List (Optional):972065}    {Diagnostic Test Results (Optional):476406}    {AMBULATORY ATTESTATION (Optional):203973}

## 2021-01-21 NOTE — LETTER
January 28, 2021      Lisa Cazares  [address]  [place] [address]        Dear ,    We are writing to inform you of your test results.    It was nice to visit with you recently.   Let's review your lab work:   Your Hepatitis c and HIV screening tests returned normal.   Your uric acid level (screening test for gout) also returned normal. It's most likely then that the pain you are experiencing in your toe is related to neuropathy or nerve pain. We often see this in diabetes. I hope the gabapentin medication is helping at nighttime.   Your urine protein level returned elevated. I will put in a future lab order to recheck another urine test to reassess if you are spilling too much protein in your urine. We can check at your next office visit or if you would like to have this done beforehand please schedule a lab appointment by calling [phone].   Please be in touch with any questions or concerns.     Resulted Orders   Albumin Random Urine Quantitative with Creat Ratio   Result Value Ref Range    Creatinine Urine 162 mg/dL    Albumin Urine mg/L 81 mg/L    Albumin Urine mg/g Cr 49.75 (H) 0 - 17 mg/g Cr   Uric acid   Result Value Ref Range    Uric Acid 6.7 3.5 - 7.2 mg/dL   HIV Antigen Antibody Combo   Result Value Ref Range    HIV Antigen Antibody Combo Nonreactive NR^Nonreactive          Comment:      HIV-1 p24 Ag & HIV-1/HIV-2 Ab Not Detected   Hepatitis C antibody   Result Value Ref Range    Hepatitis C Antibody Nonreactive NR^Nonreactive      Comment:      Assay performance characteristics have not been established for newborns,   infants, and children         If you have any questions or concerns, please call the clinic at the number listed above.       Sincerely,      Mark Peter PA-C/nr

## 2021-01-22 LAB
CREAT UR-MCNC: 162 MG/DL
MICROALBUMIN UR-MCNC: 81 MG/L
MICROALBUMIN/CREAT UR: 49.75 MG/G CR (ref 0–17)
URATE SERPL-MCNC: 6.7 MG/DL (ref 3.5–7.2)

## 2021-02-03 ENCOUNTER — TELEPHONE (OUTPATIENT)
Dept: FAMILY MEDICINE | Facility: CLINIC | Age: 51
End: 2021-02-03

## 2021-02-03 NOTE — TELEPHONE ENCOUNTER
Left message for Lisa gabriel to call back to let him know providers message below.  Your uric acid level (screening test for gout) also returned normal. It's most likely then that the pain you are experiencing in your toe is related to neuropathy or nerve pain. We often see this in diabetes. I hope the gabapentin medication is helping at nighttime.     Kamla Rehman RN

## 2021-02-03 NOTE — TELEPHONE ENCOUNTER
Reason for Call:  Medication or medication refill:    Do you use a [facility]?  Name of the pharmacy and phone number for the current request:  [id number] - [place] [id number]    Name of the medication requested: Unknown    Other request: Patient's friend Andriy Quispe came into the clinic and would like to know if patient should be taking any medications for gout, she seemed to think that he was suppose to. Please advise, thank you!    *No consent to communicate on file & she was informed*    Can we leave a detailed message on this number? Not Applicable    Phone number patient can be reached at: Home number on file [phone] (home)    Best Time: anytime    Call taken on 2/3/2021 at 12:22 PM by Claudia Hancock

## 2021-02-25 ENCOUNTER — OFFICE VISIT (OUTPATIENT)
Dept: FAMILY MEDICINE | Facility: CLINIC | Age: 51
End: 2021-02-25
Payer: COMMERCIAL

## 2021-02-25 VITALS
BODY MASS INDEX: 25.92 KG/M2 | OXYGEN SATURATION: 99 % | HEART RATE: 77 BPM | DIASTOLIC BLOOD PRESSURE: 81 MMHG | HEIGHT: 69 IN | RESPIRATION RATE: 18 BRPM | TEMPERATURE: 97.8 F | SYSTOLIC BLOOD PRESSURE: 146 MMHG | WEIGHT: 175 LBS

## 2021-02-25 DIAGNOSIS — L98.9 HAND LESION: Primary | ICD-10-CM

## 2021-02-25 PROCEDURE — 99212 OFFICE O/P EST SF 10 MIN: CPT | Performed by: FAMILY MEDICINE

## 2021-02-25 ASSESSMENT — MIFFLIN-ST. JEOR: SCORE: 1629.17

## 2021-02-25 NOTE — PROGRESS NOTES
"    Assessment & Plan     Hand lesion -right palm  Discussed that this is likely a benign cysts. Given that it has grown in size and involves dominant hand will refer to ortho for eval and treatment options.  - Orthopedic & Spine  Referral      14 minutes spent on the date of the encounter doing chart review, history and exam, documentation and further activities as noted above      DO BEKAH [facility]    Gregory gabriel is a 51 year old who presents for the following health issues  accompanied by his relative who helps to interpret:    HPI   Patient here for growth on the palm of his right hand that the noticed one month ago. States that it has grown larger since in first noticed it. Feels like his right hand is slightly weaker, but no numbness or tingling. Works as a cook. Right hand dominant.     Hand pain  Onset/Duration: 1 month   Description  Location: hand - right  Joint Swelling: no  Redness: no  Pain: no  Warmth: no  Intensity:  mild  Progression of Symptoms:  same  Accompanying signs and symptoms:   Fevers: no  Numbness/tingling/weakness: no  History  Trauma to the area: no  Recent illness:  no  Previous similar problem: no  Previous evaluation:  no  Precipitating or alleviating factors:  Aggravating factors include: none  Therapies tried and outcome: nothing    Review of Systems   CONSTITUTIONAL: NEGATIVE for fever, chills, change in weight      Objective    BP (!) 146/81 (BP Location: Left arm, Patient Position: Sitting, Cuff Size: Adult Regular)   Pulse 77   Temp 97.8  F (36.6  C) (Tympanic)   Resp 18   Ht 1.753 m (5' 9\")   Wt 79.4 kg (175 lb)   SpO2 99%   BMI 25.84 kg/m    Body mass index is 25.84 kg/m .  Physical Exam   GENERAL: healthy, alert and no distress  RESP: lungs clear to auscultation - no rales, rhonchi or wheezes  MS: about 1.5 cm round, slightly firm lesion just beneath skin of right palm about 2cm distal to base of right finger. " Lesion does not seem to move with flexion/extension of ring finger. Strength and sensation intact b/l hands.

## 2021-03-01 NOTE — TELEPHONE ENCOUNTER
DIAGNOSIS: Hand lesion R/Dr Gunter/ no images/ [facility]/ ortho con  Please use ipad or phones to reach  at [phone] option 0   APPOINTMENT DATE: 3/2   NOTES STATUS DETAILS   OFFICE NOTE from referring provider Internal 2/25/2021  [facility]       OFFICE NOTE from other specialist N/A    DISCHARGE SUMMARY from hospital     DISCHARGE REPORT from the ER     OPERATIVE REPORT     EMG report     MEDICATION LIST     MRI     DEXA (osteoporosis/bone health)     CT SCAN     XRAYS (IMAGES & REPORTS)

## 2021-03-02 ENCOUNTER — ANCILLARY PROCEDURE (OUTPATIENT)
Dept: GENERAL RADIOLOGY | Facility: CLINIC | Age: 51
End: 2021-03-02
Attending: FAMILY MEDICINE
Payer: COMMERCIAL

## 2021-03-02 ENCOUNTER — PRE VISIT (OUTPATIENT)
Dept: ORTHOPEDICS | Facility: CLINIC | Age: 51
End: 2021-03-02

## 2021-03-02 ENCOUNTER — OFFICE VISIT (OUTPATIENT)
Dept: ORTHOPEDICS | Facility: CLINIC | Age: 51
End: 2021-03-02
Attending: FAMILY MEDICINE
Payer: COMMERCIAL

## 2021-03-02 VITALS — HEIGHT: 69 IN | WEIGHT: 175 LBS | BODY MASS INDEX: 25.92 KG/M2

## 2021-03-02 DIAGNOSIS — L98.9 HAND LESION: ICD-10-CM

## 2021-03-02 DIAGNOSIS — M25.50 POLYARTHRALGIA: ICD-10-CM

## 2021-03-02 DIAGNOSIS — M67.441 GANGLION CYST OF FLEXOR TENDON SHEATH OF FINGER OF RIGHT HAND: Primary | ICD-10-CM

## 2021-03-02 LAB
CRP SERPL-MCNC: <2.9 MG/L (ref 0–8)
ERYTHROCYTE [SEDIMENTATION RATE] IN BLOOD BY WESTERGREN METHOD: 12 MM/H (ref 0–20)

## 2021-03-02 PROCEDURE — 86140 C-REACTIVE PROTEIN: CPT | Performed by: PATHOLOGY

## 2021-03-02 PROCEDURE — 73130 X-RAY EXAM OF HAND: CPT | Mod: RT | Performed by: RADIOLOGY

## 2021-03-02 PROCEDURE — 86038 ANTINUCLEAR ANTIBODIES: CPT | Performed by: PATHOLOGY

## 2021-03-02 PROCEDURE — 86431 RHEUMATOID FACTOR QUANT: CPT | Mod: 90 | Performed by: PATHOLOGY

## 2021-03-02 PROCEDURE — 86039 ANTINUCLEAR ANTIBODIES (ANA): CPT | Performed by: PATHOLOGY

## 2021-03-02 PROCEDURE — 85652 RBC SED RATE AUTOMATED: CPT | Performed by: PATHOLOGY

## 2021-03-02 PROCEDURE — 86200 CCP ANTIBODY: CPT | Mod: 90 | Performed by: PATHOLOGY

## 2021-03-02 PROCEDURE — 36415 COLL VENOUS BLD VENIPUNCTURE: CPT | Performed by: PATHOLOGY

## 2021-03-02 PROCEDURE — 99214 OFFICE O/P EST MOD 30 MIN: CPT | Performed by: FAMILY MEDICINE

## 2021-03-02 ASSESSMENT — MIFFLIN-ST. JEOR: SCORE: 1629.17

## 2021-03-02 NOTE — PROGRESS NOTES
SPORTS & ORTHOPEDIC WALK-IN VISIT 3/2/2021    Primary Care Physician: Mark Wallace R    Noticed R hand growth about a month ago. Has been growing since he noticed it.  Growth is located along the palmar aspect of his 4th metacarpal.    Not painful per se. But wants to make sure this I benign and not something to worry about.    Reason for visit:     What part of your body is injured / painful?  right hand    What caused the injury /pain? No inciting event     How long ago did your injury occur or pain begin? about a month ago    What are your most bothersome symptoms? Pain and Swelling    How would you characterize your symptom?  dull and sharp    What makes your symptoms better? Rest    What makes your symptoms worse? Other: using his hand for prolonged period of time.    Have you been previously seen for this problem? Yes, FP    Medical History:    Any recent changes to your medical history? No    Any new medication prescribed since last visit? No    Have you had surgery on this body part before? No    Social History:    Occupation: [employer]    Handedness: Left    Exercise: Home exercises    Review of Systems:    Do you have fever, chills, weight loss? No    Do you have any vision problems? Yes, diabetic related vision issues    Do you have any chest pain or edema? No    Do you have any shortness of breath or wheezing?  No    Do you have stomach problems? No    Do you have any numbness or focal weakness? No    Do you have diabetes? Yes, Type 2    Do you have problems with bleeding or clotting? No    Do you have an rashes or other skin lesions? No

## 2021-03-02 NOTE — LETTER
3/2/2021         RE: Lisa Cazares  [address]  [facility] [address]        Dear Colleague,    Thank you for referring your patient, Lisa Cazares, to the [facility]THOP[facility]INIC [place]. Please see a copy of my visit note below.          SPORTS & ORTHOPEDIC WALK-IN VISIT 3/2/2021    Primary Care Physician: Mark Wallace    Noticed R hand growth about a month ago. Has been growing since he noticed it.  Growth is located along the palmar aspect of his 4th metacarpal.    Not painful per se. But wants to make sure this I benign and not something to worry about.    Reason for visit:     What part of your body is injured / painful?  right hand    What caused the injury /pain? No inciting event     How long ago did your injury occur or pain begin? about a month ago    What are your most bothersome symptoms? Pain and Swelling    How would you characterize your symptom?  dull and sharp    What makes your symptoms better? Rest    What makes your symptoms worse? Other: using his hand for prolonged period of time.    Have you been previously seen for this problem? Yes, FP    Medical History:    Any recent changes to your medical history? No    Any new medication prescribed since last visit? No    Have you had surgery on this body part before? No    Social History:    Occupation: [employer]    Handedness: Left    Exercise: Home exercises    Review of Systems:    Do you have fever, chills, weight loss? No    Do you have any vision problems? Yes, diabetic related vision issues    Do you have any chest pain or edema? No    Do you have any shortness of breath or wheezing?  No    Do you have stomach problems? No    Do you have any numbness or focal weakness? No    Do you have diabetes? Yes, Type 2    Do you have problems with bleeding or clotting? No    Do you have an rashes or other skin lesions? No           CHIEF COMPLAINT:  Pain of the Right Hand       HISTORY OF PRESENT ILLNESS    Debora is a pleasant 51 year old year old male who presents to clinic today with acute nodule of right hand. Small nodule formed or was first noticed about a month ago. Has been growing since he noticed it.    Growth is located along the palmar aspect of his 4th metacarpal. Not painful per se. But wants to make sure this I benign and not something to worry about. Referred from PCP.    Also notes nodes of multiple fingers of DIP and PIP joints.  Notes pain in mornings.  Has concerns about his fingers otherwise. No injuries. Present >1 year. Additional swelling in great toe, though thought to be gout previously. Recent uric acid negative.      What part of your body is injured / painful?  right hand    What caused the injury /pain? No inciting event     How long ago did your injury occur or pain begin? about a month ago    What are your most bothersome symptoms? Pain and Swelling    How would you characterize your symptom?  dull and sharp    What makes your symptoms better? Rest    What makes your symptoms worse? Other: using his hand for prolonged period of time.    Have you been previously seen for this problem? Yes, FP    Additional history: as documented    MEDICAL HISTORY  Patient Active Problem List   Diagnosis     Diabetes mellitus, type 2 (H)       Current Outpatient Medications   Medication Sig Dispense Refill     atorvastatin (LIPITOR) 10 MG tablet Take 10 mg by mouth daily       atorvastatin (LIPITOR) 10 MG tablet Take 1 tablet (10 mg) by mouth daily 90 tablet 3     gabapentin (NEURONTIN) 100 MG capsule Take 1 capsule (100 mg) by mouth At Bedtime 90 capsule 0     metFORMIN (GLUCOPHAGE) 1000 MG tablet Take 1 tablet (1,000 mg) by mouth 2 times daily (with meals) 180 tablet 3     metFORMIN (GLUCOPHAGE) 1000 MG tablet Take 1,000 mg by mouth 2 times daily (with meals)       metFORMIN (GLUCOPHAGE) 500 MG tablet Take 1 tablet (500 mg) by mouth 2 times daily (with meals) 180 tablet 3       No Known Allergies    No  "family history on file.    Additional medical/Social/Surgical histories reviewed in [facility] and updated as appropriate.     REVIEW OF SYSTEMS (3/3/2021)  A 10-point review of systems was obtained and is negative except for as noted in the HPI.      PHYSICAL EXAM  Ht 1.753 m (5' 9\")   Wt 79.4 kg (175 lb)   BMI 25.84 kg/m      Musculoskeletal: Hands  Inspection:  Mild nodular appearance of PIP and DIP joints of both hands.  No erythema or discoloration.    Palpation: Nontender joints.  Additional small palpable nodule at palm of hand near fourth metacarpal shaft /flexor tendon.  Range of motion: Good overall range of motion of thumb in all planes.  Fingers with normal flexion and extension. No rotational malalignment or scissoring of digits.  Strength: Full strength of flexion and extension of digits.   strength normal compared bilaterally.   Neurologic: Sensation intact throughout hand and fingers  Vascular: Capillary refill <2s.  Digits warm, well perfused.     IMAGING : XR Hand right. Final results and radiologist's interpretation, available in the [facility] health record. Images were reviewed with the patient/family members in the office today. My personal interpretation of the performed imaging is no acute osseous abnormalities or degenerative changes.    US LTD IN-OFFICE: Right hand US- hypoechoic cystic focus adjacent fourth metacarpal flexor tendon.  No doppler flow. Consistent with ganglion cyst eminating from tendon sheath rather than a joint.    Labs Uric Acid - 6.7     ASSESSMENT & PLAN  Mr. Cazares is a 51 year old year old male who presents to clinic today with acute lesion of right hand as well as complaint of painful/nodular appearance of joints of hands increasing over the past year.     Diagnosis:   Ganglion cyst of right hand  Polyarthralgia    -Ensured benign nature of small ganglion cyst, will monitor for now  -No significant DJD on xrays, nodular appearance of fingers may be early OA vs. RA.  Will " order labs r/o rheumatologic process. ESR, CRP, RF, JUAN, CCP ab.   -Follow up: Call with labs.     It was a pleasure seeing Lisa gabriel today.    32 minutes on date of the encounter doing chart review, history and examination, independent imaging review, documentation, independent ultrasound scanning/interpretation and additional activities noted above.      Conner Levin DO, CAQSM  Primary Care Sports Medicine

## 2021-03-03 LAB
ANA PAT SER IF-IMP: ABNORMAL
ANA SER QL IF: ABNORMAL
ANA TITR SER IF: ABNORMAL {TITER}
CCP AB SER IA-ACNC: 1 U/ML

## 2021-03-04 NOTE — PROGRESS NOTES
CHIEF COMPLAINT:  Pain of the Right Hand       HISTORY OF PRESENT ILLNESS  Mr. Cazares is a pleasant 51 year old year old male who presents to clinic today with acute nodule of right hand. Small nodule formed or was first noticed about a month ago. Has been growing since he noticed it.    Growth is located along the palmar aspect of his 4th metacarpal. Not painful per se. But wants to make sure this I benign and not something to worry about. Referred from PCP.    Also notes nodes of multiple fingers of DIP and PIP joints.  Notes pain in mornings.  Has concerns about his fingers otherwise. No injuries. Present >1 year. Additional swelling in great toe, though thought to be gout previously. Recent uric acid negative.      What part of your body is injured / painful?  right hand    What caused the injury /pain? No inciting event     How long ago did your injury occur or pain begin? about a month ago    What are your most bothersome symptoms? Pain and Swelling    How would you characterize your symptom?  dull and sharp    What makes your symptoms better? Rest    What makes your symptoms worse? Other: using his hand for prolonged period of time.    Have you been previously seen for this problem? Yes, FP    Additional history: as documented    MEDICAL HISTORY  Patient Active Problem List   Diagnosis     Diabetes mellitus, type 2 (H)       Current Outpatient Medications   Medication Sig Dispense Refill     atorvastatin (LIPITOR) 10 MG tablet Take 10 mg by mouth daily       atorvastatin (LIPITOR) 10 MG tablet Take 1 tablet (10 mg) by mouth daily 90 tablet 3     gabapentin (NEURONTIN) 100 MG capsule Take 1 capsule (100 mg) by mouth At Bedtime 90 capsule 0     metFORMIN (GLUCOPHAGE) 1000 MG tablet Take 1 tablet (1,000 mg) by mouth 2 times daily (with meals) 180 tablet 3     metFORMIN (GLUCOPHAGE) 1000 MG tablet Take 1,000 mg by mouth 2 times daily (with meals)       metFORMIN (GLUCOPHAGE) 500 MG tablet Take 1 tablet (500 mg) by  "mouth 2 times daily (with meals) 180 tablet 3       No Known Allergies    No family history on file.    Additional medical/Social/Surgical histories reviewed in [facility] and updated as appropriate.     REVIEW OF SYSTEMS (3/3/2021)  A 10-point review of systems was obtained and is negative except for as noted in the HPI.      PHYSICAL EXAM  Ht 1.753 m (5' 9\")   Wt 79.4 kg (175 lb)   BMI 25.84 kg/m      Musculoskeletal: Hands  Inspection:  Mild nodular appearance of PIP and DIP joints of both hands.  No erythema or discoloration.    Palpation: Nontender joints.  Additional small palpable nodule at palm of hand near fourth metacarpal shaft /flexor tendon.  Range of motion: Good overall range of motion of thumb in all planes.  Fingers with normal flexion and extension. No rotational malalignment or scissoring of digits.  Strength: Full strength of flexion and extension of digits.   strength normal compared bilaterally.   Neurologic: Sensation intact throughout hand and fingers  Vascular: Capillary refill <2s.  Digits warm, well perfused.     IMAGING : XR Hand right. Final results and radiologist's interpretation, available in the [facility] health record. Images were reviewed with the patient/family members in the office today. My personal interpretation of the performed imaging is no acute osseous abnormalities or degenerative changes.    US LTD IN-OFFICE: Right hand US- hypoechoic cystic focus adjacent fourth metacarpal flexor tendon.  No doppler flow. Consistent with ganglion cyst eminating from tendon sheath rather than a joint.    Labs Uric Acid - 6.7     ASSESSMENT & PLAN  Mr. Cazares is a 51 year old year old male who presents to clinic today with acute lesion of right hand as well as complaint of painful/nodular appearance of joints of hands increasing over the past year.     Diagnosis:   Ganglion cyst of right hand  Polyarthralgia    -Ensured benign nature of small ganglion cyst, will monitor for now  -No significant " DJD on xrays, nodular appearance of fingers may be early OA vs. RA.  Will order labs r/o rheumatologic process. ESR, CRP, RF, JUAN, CCP ab.   -Follow up: Call with labs.     It was a pleasure seeing Lisa gabriel today.    32 minutes on date of the encounter doing chart review, history and examination, independent imaging review, documentation, independent ultrasound scanning/interpretation and additional activities noted above.      Conner Levin DO, CAQSM  Primary Care Sports Medicine

## 2021-03-05 ENCOUNTER — TELEPHONE (OUTPATIENT)
Dept: ORTHOPEDICS | Facility: CLINIC | Age: 51
End: 2021-03-05

## 2021-03-05 NOTE — RESULT ENCOUNTER NOTE
Lisa Sawant  has labs overall not consistent with rheumatoid arthritis. No inflammatory elevations.  Suspect osteoarthritis as discussed. Please let him know with Mandarin . Thank you!

## 2021-03-06 LAB — RHEUMATOID FACT SER NEPH-ACNC: 8 IU/ML (ref 0–20)

## 2021-03-08 NOTE — TELEPHONE ENCOUNTER
A voicemail was left with an  for lab results .     Per huddle with Dr. Levin, his labs are not consistent with rheumatoid arthritis. No inflammatory markers. Dr. Levin is suspecting osteoarthritis as discussed.

## 2021-03-09 ENCOUNTER — VIRTUAL VISIT (OUTPATIENT)
Dept: ORTHOPEDICS | Facility: CLINIC | Age: 51
End: 2021-03-09
Payer: COMMERCIAL

## 2021-03-09 DIAGNOSIS — M67.441 GANGLION CYST OF FLEXOR TENDON SHEATH OF FINGER OF RIGHT HAND: ICD-10-CM

## 2021-03-09 DIAGNOSIS — M25.50 POLYARTHRALGIA: Primary | ICD-10-CM

## 2021-03-09 PROCEDURE — 99213 OFFICE O/P EST LOW 20 MIN: CPT | Mod: 95 | Performed by: FAMILY MEDICINE

## 2021-03-09 NOTE — PROGRESS NOTES
Lisa gabriel is a 51 year old who is being evaluated via a billable telephone visit.      INTERPRETOR USED FOR TRANSLATION SERVICES -MANDARIN NATIVE SPEAKER    What phone number would you like to be contacted at? [phone]  How would you like to obtain your AVS? Cori    ESTABLISHED PATIENT FOLLOW-UP TELEPHONE:  Review Lab results    This encounter was conducted via telephone in lieu of face-to-face encounter due to precautions implemented during COVID-19 pandemic.     HISTORY OF PRESENT ILLNESS  Mr. Cazares is a pleasant 51 year old year old male who presents via telephone today for follow-up of lab results ordered last week for possible rheumatoid arthritis for diagnosis of polyarthralgia and joint swelling.    Additional medical/Social/Surgical histories reviewed in [facility] and updated as appropriate.    LABS:  CRP<2.9  ESR 12  ANTI CCP negative  JUAN borderline positive  RF NEGATIVE     ASSESSMENT & PLAN  Mr. Cazares is a 51 year old year old male who presents via telephone today.       Reviewed lab results and not consistent with rheumatoid arthritis. Nodular appearance of hands likely secondary to early osteoarthritis. Voltaren gel may be used, can consider corticosteroid injection for symptomatic joints.      Will continue to monitor ganglion cyst as discussed at previous in-person visit.    Ganglion cyst of right hand  Swelling of joints of bilateral hands    Follow up PRN     It was a pleasure seeing Lisa gabriel.    Conner Levin DO, ANGM  Primary Care Sports Medicine  Department of Orthopedic Surgery  [facility]        Phone call duration: 15 minutes

## 2021-03-09 NOTE — LETTER
3/9/2021         RE: Lisa Cazares  [address]  [facility] [address]        Dear Colleague,    Thank you for referring your patient, Lisa Cazares, to the [facility] [place]. Please see a copy of my visit note below.    Lisa gabriel is a 51 year old who is being evaluated via a billable telephone visit.      INTERPRETOR USED FOR TRANSLATION SERVICES -MANDARIN NATIVE SPEAKER    What phone number would you like to be contacted at? [phone]  How would you like to obtain your AVS? Jant    ESTABLISHED PATIENT FOLLOW-UP TELEPHONE:  Review Lab results    This encounter was conducted via telephone in lieu of face-to-face encounter due to precautions implemented during COVID-19 pandemic.     HISTORY OF PRESENT ILLNESS  Mr. Cazares is a pleasant 51 year old year old male who presents via telephone today for follow-up of lab results ordered last week for possible rheumatoid arthritis for diagnosis of polyarthralgia and joint swelling.    Additional medical/Social/Surgical histories reviewed in [facility] and updated as appropriate.    LABS:  CRP<2.9  ESR 12  ANTI CCP negative  JUAN borderline positive  RF NEGATIVE     ASSESSMENT & PLAN  Mr. Cazares is a 51 year old year old male who presents via telephone today.       Reviewed lab results and not consistent with rheumatoid arthritis. Nodular appearance of hands likely secondary to early osteoarthritis. Voltaren gel may be used, can consider corticosteroid injection for symptomatic joints.      Will continue to monitor ganglion cyst as discussed at previous in-person visit.    Ganglion cyst of right hand  Swelling of joints of bilateral hands    Follow up PRN     It was a pleasure seeing Lisa gabriel.    Conner Levin DO, CAM  Primary Care Sports Medicine  Department of Orthopedic Surgery  [facility]        Phone call duration: 15 minutes

## 2021-04-28 ENCOUNTER — OFFICE VISIT (OUTPATIENT)
Dept: FAMILY MEDICINE | Facility: CLINIC | Age: 51
End: 2021-04-28
Payer: COMMERCIAL

## 2021-04-28 VITALS
RESPIRATION RATE: 16 BRPM | SYSTOLIC BLOOD PRESSURE: 134 MMHG | OXYGEN SATURATION: 97 % | WEIGHT: 181 LBS | BODY MASS INDEX: 26.81 KG/M2 | HEART RATE: 82 BPM | HEIGHT: 69 IN | TEMPERATURE: 96.3 F | DIASTOLIC BLOOD PRESSURE: 83 MMHG

## 2021-04-28 DIAGNOSIS — E11.9 TYPE 2 DIABETES MELLITUS WITHOUT COMPLICATION, WITHOUT LONG-TERM CURRENT USE OF INSULIN (H): ICD-10-CM

## 2021-04-28 DIAGNOSIS — R80.9 MICROALBUMINURIA: ICD-10-CM

## 2021-04-28 DIAGNOSIS — Z12.5 SCREENING FOR PROSTATE CANCER: ICD-10-CM

## 2021-04-28 DIAGNOSIS — Z00.00 ROUTINE GENERAL MEDICAL EXAMINATION AT A HEALTH CARE FACILITY: Primary | ICD-10-CM

## 2021-04-28 LAB
HBA1C MFR BLD: 6.5 % (ref 0–5.6)
PSA SERPL-ACNC: 0.59 UG/L (ref 0–4)

## 2021-04-28 PROCEDURE — 99396 PREV VISIT EST AGE 40-64: CPT | Performed by: PHYSICIAN ASSISTANT

## 2021-04-28 PROCEDURE — 36415 COLL VENOUS BLD VENIPUNCTURE: CPT | Performed by: PHYSICIAN ASSISTANT

## 2021-04-28 PROCEDURE — G0103 PSA SCREENING: HCPCS | Performed by: PHYSICIAN ASSISTANT

## 2021-04-28 PROCEDURE — 83036 HEMOGLOBIN GLYCOSYLATED A1C: CPT | Performed by: PHYSICIAN ASSISTANT

## 2021-04-28 RX ORDER — ATORVASTATIN CALCIUM 10 MG/1
10 TABLET, FILM COATED ORAL DAILY
Qty: 90 TABLET | Refills: 3 | Status: SHIPPED | OUTPATIENT
Start: 2021-04-28 | End: 2022-03-22

## 2021-04-28 ASSESSMENT — MIFFLIN-ST. JEOR: SCORE: 1661

## 2021-04-28 NOTE — PROGRESS NOTES
3  SUBJECTIVE:   CC: Lisa Cazares is an 51 year old male who presents for preventive health visit.   Patient has been advised of split billing requirements and indicates understanding: Yes  Healthy Habits:      Do you get at least three servings of calcium containing foods daily (dairy, green leafy vegetables, etc.)? yes    Amount of exercise or daily activities, outside of work: 7 day(s) per week    Problems taking medications regularly No    Medication side effects: Yes - believes recent blurry vision is a side effect of medication. Urine color is more yellow than usual - believes this may be medication side effect.    Have you had an eye exam in the past two years? no    Do you see a dentist twice per year? Yes     Do you have sleep apnea, excessive snoring or daytime drowsiness?no    Today's PHQ-2 Score:   PHQ-2 ( 1999 Pfizer) 3/9/2021 2/25/2021   Q1: Little interest or pleasure in doing things 0 0   Q2: Feeling down, depressed or hopeless 0 0   PHQ-2 Score 0 0       Abuse: Current or Past(Physical, Sexual or Emotional)- No  Do you feel safe in your environment? Yes    Have you ever done Advance Care Planning? (For example, a Health Directive, POLST, or a discussion with a medical provider or your loved ones about your wishes): No, advance care planning information given to patient to review.  Patient declined advance care planning discussion at this time.    Social History     Tobacco Use     Smoking status: Never Smoker     Smokeless tobacco: Never Used   Substance Use Topics     Alcohol use: Never     Frequency: Never     Binge frequency: Never     If you drink alcohol do you typically have >3 drinks per day or >7 drinks per week? No                      Last PSA: No results found for: PSA    Reviewed orders with patient. Reviewed health maintenance and updated orders accordingly - Yes      Reviewed and updated as needed this visit by clinical staff                 Reviewed and updated as needed this visit  by Provider                    ROS:  CONSTITUTIONAL: NEGATIVE for fever, chills, change in weight  INTEGUMENTARY/SKIN: NEGATIVE for worrisome rashes, moles or lesions  EYES: NEGATIVE for vision changes or irritation  ENT: NEGATIVE for ear, mouth and throat problems  RESP: NEGATIVE for significant cough or SOB  CV: NEGATIVE for chest pain, palpitations or peripheral edema  GI: NEGATIVE for nausea, abdominal pain, heartburn, or change in bowel habits   male: negative for dysuria, hematuria, decreased urinary stream, erectile dysfunction, urethral discharge  MUSCULOSKELETAL: NEGATIVE for significant arthralgias or myalgia  NEURO: NEGATIVE for weakness, dizziness or paresthesias  PSYCHIATRIC: NEGATIVE for changes in mood or affect    OBJECTIVE:   There were no vitals taken for this visit.  EXAM:  GENERAL: healthy, alert and no distress  EYES: Eyes grossly normal to inspection, PERRL and conjunctivae and sclerae normal  HENT: ear canals and TM's normal, nose and mouth without ulcers or lesions  NECK: no adenopathy, no asymmetry, masses, or scars and thyroid normal to palpation  RESP: lungs clear to auscultation - no rales, rhonchi or wheezes  CV: regular rate and rhythm, normal S1 S2, no S3 or S4, no murmur, click or rub, no peripheral edema and peripheral pulses strong  ABDOMEN: soft, nontender, no hepatosplenomegaly, no masses and bowel sounds normal  MS: no gross musculoskeletal defects noted, no edema  SKIN: no suspicious lesions or rashes  NEURO: Normal strength and tone, mentation intact and speech normal  PSYCH: mentation appears normal, affect normal/bright    Diagnostic Test Results:  Labs reviewed in Epic  Results for orders placed or performed in visit on 04/28/21   Hemoglobin A1c     Status: Abnormal   Result Value Ref Range    Hemoglobin A1C 6.5 (H) 0 - 5.6 %   PSA, screen     Status: None   Result Value Ref Range    PSA 0.59 0 - 4 ug/L       ASSESSMENT/PLAN:   1. Routine general medical examination at a  "health care facility  2. Type 2 diabetes mellitus without complication, without long-term current use of insulin (H)  - Hemoglobin A1c  - atorvastatin (LIPITOR) 10 MG tablet; Take 1 tablet (10 mg) by mouth daily  Dispense: 90 tablet; Refill: 3  3. Microalbuminuria  - Protein timed urine with Creat Ratio; Future  4. Screening for prostate cancer  - PSA, screen    Continue Lipitor 10 mg daily  Continue Metformin 1000 mg BID   A1c at goal at 6.5%  At last visit in January micro albumin was high will confirm with 24 hour urine protein     COUNSELING:  Reviewed preventive health counseling, as reflected in patient instructions       Regular exercise       Healthy diet/nutrition       Immunizations    In process of receiving COVID vaccine         Prostate cancer screening    Estimated body mass index is 25.84 kg/m  as calculated from the following:    Height as of 3/2/21: 1.753 m (5' 9\").    Weight as of 3/2/21: 79.4 kg (175 lb).        He reports that he has never smoked. He has never used smokeless tobacco.      Counseling Resources:  ATP IV Guidelines  Pooled Cohorts Equation Calculator  FRAX Risk Assessment  ICSI Preventive Guidelines  Dietary Guidelines for Americans, 2010  [employer] MyPlate  ASA Prophylaxis  Lung CA Screening    Mark Peter PA-C  [facility]  "

## 2021-04-28 NOTE — PROGRESS NOTES
"  SUBJECTIVE:   CC: Lisa Cazares is an 51 year old male who presents for preventative health visit.       Patient has been advised of split billing requirements and indicates understanding: Yes  HPI  {Add if <65 person on Medicare  - Required Questions (Optional):439938}  {Outside tests to abstract? :379696}    {additional problems to add (Optional):339322}    Today's PHQ-2 Score:   PHQ-2 ( 1999 Pfizer) 3/9/2021   Q1: Little interest or pleasure in doing things 0   Q2: Feeling down, depressed or hopeless 0   PHQ-2 Score 0       Abuse: Current or Past(Physical, Sexual or Emotional)- { :359965}  Do you feel safe in your environment? { :271442}    Have you ever done Advance Care Planning? (For example, a Health Directive, POLST, or a discussion with a medical provider or your loved ones about your wishes): { :285063}    Social History     Tobacco Use     Smoking status: Never Smoker     Smokeless tobacco: Never Used   Substance Use Topics     Alcohol use: Never     Frequency: Never     Binge frequency: Never     {Rooming Staff- Complete this question if Prescreen response is not shown below for today's visit. If you drink alcohol do you typically have >3 drinks per day or >7 drinks per week? (Optional):711634}    No flowsheet data found.{add AUDIT responses (Optional) (A score of 7 for adult men is an indication of hazardous drinking; a score of 8 or more is an indication of an alcohol use disorder.  A score of 7 or more for adult women is an indication of hazardous drinking or an alchohol use disorder):652875}    Last PSA: No results found for: PSA    Reviewed orders with patient. Reviewed health maintenance and updated orders accordingly - { :024566::\"Yes\"}  {Chronicprobdata (optional):106681}    Reviewed and updated as needed this visit by clinical staff                 Reviewed and updated as needed this visit by Provider                {HISTORY OPTIONS (Optional):658489}    Review of Systems  {MALE ROS " "(Optional):688242::\"CONSTITUTIONAL: NEGATIVE for fever, chills, change in weight\",\"INTEGUMENTARY/SKIN: NEGATIVE for worrisome rashes, moles or lesions\",\"EYES: NEGATIVE for vision changes or irritation\",\"ENT: NEGATIVE for ear, mouth and throat problems\",\"RESP: NEGATIVE for significant cough or SOB\",\"CV: NEGATIVE for chest pain, palpitations or peripheral edema\",\"GI: NEGATIVE for nausea, abdominal pain, heartburn, or change in bowel habits\",\" male: negative for dysuria, hematuria, decreased urinary stream, erectile dysfunction, urethral discharge\",\"MUSCULOSKELETAL: NEGATIVE for significant arthralgias or myalgia\",\"NEURO: NEGATIVE for weakness, dizziness or paresthesias\",\"PSYCHIATRIC: NEGATIVE for changes in mood or affect\"}    OBJECTIVE:   There were no vitals taken for this visit.    Physical Exam  {Exam Choices (Optional):120477}    {Diagnostic Test Results (Optional):713967::\"Diagnostic Test Results:\",\"Labs reviewed in Epic\"}    ASSESSMENT/PLAN:   {Diag Picklist:386811}    Patient has been advised of split billing requirements and indicates understanding: {YES / NO:901115::\"Yes\"}  COUNSELING:   {MALE COUNSELING MESSAGES:929216::\"Reviewed preventive health counseling, as reflected in patient instructions\"}    Estimated body mass index is 25.84 kg/m  as calculated from the following:    Height as of 3/2/21: 1.753 m (5' 9\").    Weight as of 3/2/21: 79.4 kg (175 lb).     {Weight Management Plan (ACO) Complete if BMI is abnormal-  Ages 18-64  BMI >24.9.  Age 65+ with BMI <23 or >30 (Optional):510947}    He reports that he has never smoked. He has never used smokeless tobacco.      Counseling Resources:  ATP IV Guidelines  Pooled Cohorts Equation Calculator  FRAX Risk Assessment  ICSI Preventive Guidelines  Dietary Guidelines for Americans, 2010  USDA's MyPlate  ASA Prophylaxis  Lung CA Screening    Mark Peter PA-C  [facility]  "

## 2021-05-03 DIAGNOSIS — E11.9 TYPE 2 DIABETES MELLITUS WITHOUT COMPLICATION, WITHOUT LONG-TERM CURRENT USE OF INSULIN (H): Primary | ICD-10-CM

## 2021-05-03 NOTE — TELEPHONE ENCOUNTER
Mark-Please review, sign if agree and may close encounter.    Routing refill request to provider for review/approval because:  Medication is reported/historical      Thank you!  CHETAN ChirinosN, RN  [facility]

## 2021-05-03 NOTE — TELEPHONE ENCOUNTER
Patient went to the pharmacy and realized her   metFORMIN (GLUCOPHAGE) 1000 MG tablets was not filled. Patient states she is still taking this medication and need this filled as soon as possible because she only have 2 pills left.

## 2021-05-04 DIAGNOSIS — R80.9 MICROALBUMINURIA: ICD-10-CM

## 2021-05-04 PROCEDURE — 84156 ASSAY OF PROTEIN URINE: CPT | Performed by: PHYSICIAN ASSISTANT

## 2021-05-05 LAB
PROT 24H UR-MRATE: 0.29 G/(24.H) (ref 0.04–0.23)
PROT UR-MCNC: 0.18 G/L
PROT/CREAT 24H UR: 0.12 G/G CR (ref 0–0.2)

## 2021-05-23 RX ORDER — LISINOPRIL 2.5 MG/1
2.5 TABLET ORAL DAILY
Qty: 90 TABLET | Refills: 3 | Status: SHIPPED | OUTPATIENT
Start: 2021-05-23 | End: 2022-06-15

## 2021-06-07 ENCOUNTER — TELEPHONE (OUTPATIENT)
Dept: FAMILY MEDICINE | Facility: CLINIC | Age: 51
End: 2021-06-07

## 2021-06-07 DIAGNOSIS — E11.9 TYPE 2 DIABETES MELLITUS WITHOUT COMPLICATION, WITHOUT LONG-TERM CURRENT USE OF INSULIN (H): ICD-10-CM

## 2021-06-07 DIAGNOSIS — R80.9 MICROALBUMINURIA: ICD-10-CM

## 2021-06-07 NOTE — TELEPHONE ENCOUNTER
"Friend Andriy Avila calling as he cannot speak english, she called pharmacy and the rx \"to protect his kidneys\" was not there. This RN called [facility], for some reason they did not get the escribe lisinopril on 5/23/21 so this RN gave it to pharmacy verbally    Andriy will now go to pick it up    CTC on file for friend Andriy Vogel RN, BSN  [facility]           "

## 2021-08-03 ENCOUNTER — OFFICE VISIT (OUTPATIENT)
Dept: OPTOMETRY | Facility: CLINIC | Age: 51
End: 2021-08-03
Payer: COMMERCIAL

## 2021-08-03 ENCOUNTER — APPOINTMENT (OUTPATIENT)
Dept: OPTOMETRY | Facility: CLINIC | Age: 51
End: 2021-08-03
Payer: COMMERCIAL

## 2021-08-03 DIAGNOSIS — H40.003 GLAUCOMA SUSPECT, BILATERAL: ICD-10-CM

## 2021-08-03 DIAGNOSIS — H52.203 MYOPIA OF BOTH EYES WITH ASTIGMATISM: ICD-10-CM

## 2021-08-03 DIAGNOSIS — E11.319 DIABETIC RETINOPATHY OF BOTH EYES WITHOUT MACULAR EDEMA ASSOCIATED WITH TYPE 2 DIABETES MELLITUS, UNSPECIFIED RETINOPATHY SEVERITY (H): Primary | ICD-10-CM

## 2021-08-03 DIAGNOSIS — H52.4 PRESBYOPIA: ICD-10-CM

## 2021-08-03 DIAGNOSIS — H52.13 MYOPIA OF BOTH EYES WITH ASTIGMATISM: ICD-10-CM

## 2021-08-03 PROCEDURE — V2020 VISION SVCS FRAMES PURCHASES: HCPCS | Performed by: OPTOMETRIST

## 2021-08-03 PROCEDURE — V2203 LENS SPHCYL BIFOCAL 4.00D/.1: HCPCS | Mod: LT | Performed by: OPTOMETRIST

## 2021-08-03 PROCEDURE — 92004 COMPRE OPH EXAM NEW PT 1/>: CPT | Performed by: OPTOMETRIST

## 2021-08-03 PROCEDURE — 92015 DETERMINE REFRACTIVE STATE: CPT | Performed by: OPTOMETRIST

## 2021-08-03 ASSESSMENT — REFRACTION_MANIFEST
OS_CYLINDER: +1.00
OS_SPHERE: -1.50
OS_SPHERE: -1.25
OS_AXIS: 059
OS_ADD: +1.50
OS_CYLINDER: +0.50
OD_AXIS: 097
OD_SPHERE: -1.50
OD_SPHERE: -1.50
OD_CYLINDER: +0.50
OD_CYLINDER: +0.75
OS_AXIS: 077
OD_ADD: +1.50
OD_AXIS: 089
METHOD_AUTOREFRACTION: 1

## 2021-08-03 ASSESSMENT — TONOMETRY
OD_IOP_MMHG: 18
IOP_METHOD: APPLANATION
OS_IOP_MMHG: 18

## 2021-08-03 ASSESSMENT — VISUAL ACUITY
OD_SC: 20/40
METHOD: SNELLEN - LINEAR
OS_SC: 20/30-1
OS_SC: 20/60-1
OS_PH_SC: 20/30
OD_PH_SC: 20/30
OS_PH_SC+: -1
OD_SC: 20/60

## 2021-08-03 ASSESSMENT — EXTERNAL EXAM - LEFT EYE: OS_EXAM: NORMAL

## 2021-08-03 ASSESSMENT — CUP TO DISC RATIO
OD_RATIO: 0.6
OS_RATIO: 0.55

## 2021-08-03 ASSESSMENT — EXTERNAL EXAM - RIGHT EYE: OD_EXAM: NORMAL

## 2021-08-03 ASSESSMENT — CONF VISUAL FIELD
OD_NORMAL: 1
OS_NORMAL: 1

## 2021-08-03 ASSESSMENT — SLIT LAMP EXAM - LIDS
COMMENTS: HOODING
COMMENTS: HOODING

## 2021-08-03 NOTE — PROGRESS NOTES
Chief Complaint   Patient presents with     Diabetic Eye Exam     Accompanied by friend  Hemoglobin A1C   Date Value Ref Range Status   04/28/2021 6.5 (H) 0 - 5.6 % Final     Comment:     Normal <5.7% Prediabetes 5.7-6.4%  Diabetes 6.5% or higher - adopted from ADA   consensus guidelines.     01/12/2021 7.3 (H) 0 - 5.6 % Final     Comment:     Normal <5.7% Prediabetes 5.7-6.4%  Diabetes 6.5% or higher - adopted from ADA   consensus guidelines.     10/15/2020 6.6 (H) 0 - 5.6 % Final     Comment:     Normal <5.7% Prediabetes 5.7-6.4%  Diabetes 6.5% or higher - adopted from ADA   consensus guidelines.       Newly diagnosed not tested in the past    Last Eye Exam: 1st eye exam  Dilated Previously: No, side effects of dilation explained today    What are you currently using to see?  does not use glasses or contacts    Distance Vision Acuity: Noticed gradual change in both eyes    Near Vision Acuity: Not satisfied     Eye Comfort: good,but sometimes hurts if looking at something far away  Do you use eye drops? 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     Medical, surgical and family histories reviewed and updated 8/3/2021.       OBJECTIVE: See Ophthalmology exam    ASSESSMENT:    ICD-10-CM    1. Diabetic retinopathy of both eyes without macular edema associated with type 2 diabetes mellitus, unspecified retinopathy severity (H)  E11.319 EYE EXAM (SIMPLE-NONBILLABLE)     Adult Eye Referral   2. Presbyopia  H52.4    3. Myopia of both eyes with astigmatism  H52.13 EYE EXAM (SIMPLE-NONBILLABLE)    H52.203 REFRACTION   4. Glaucoma suspect, bilateral  H40.003 Adult Eye Referral      PLAN:  Glucose control  Distance prescription or bifocal   Refer for LTG testing, DR to [facility]   Lisa Cazares aware  eye exam results will be sent to Mark Peter OD

## 2021-08-03 NOTE — LETTER
8/3/2021         RE: Lisa Cazares  [address]  [facility] [address]        Dear Colleague,    [facility] you [facility]ares[facility]. Please see a copy of my visit note below.    Chief Complaint   Patient presents with     Diabetic Eye Exam     Accompanied by friend  Hemoglobin A1C   Date Value Ref Range Status   04/28/2021 6.5 (H) 0 - 5.6 % Final     Comment:     Normal <5.7% Prediabetes 5.7-6.4%  Diabetes 6.5% or higher - adopted from ADA   consensus guidelines.     01/12/2021 7.3 (H) 0 - 5.6 % Final     Comment:     Normal <5.7% Prediabetes 5.7-6.4%  Diabetes 6.5% or higher - adopted from ADA   consensus guidelines.     10/15/2020 6.6 (H) 0 - 5.6 % Final     Comment:     Normal <5.7% Prediabetes 5.7-6.4%  Diabetes 6.5% or higher - adopted from ADA   consensus guidelines.       Newly diagnosed not tested in the past    Last Eye Exam: 1st eye exam  Dilated Previously: No, side effects of dilation explained today    What are you currently using to see?  does not use glasses or contacts    Distance Vision Acuity: Noticed gradual change in both eyes    Near Vision Acuity: Not satisfied     Eye Comfort: good,but sometimes hurts if looking at something far away  Do you use eye drops? 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     Medical, surgical and family histories reviewed and updated 8/3/2021.       OBJECTIVE: See Ophthalmology exam    ASSESSMENT:    ICD-10-CM    1. Diabetic retinopathy of both eyes without macular edema associated with type 2 diabetes mellitus, unspecified retinopathy severity (H)  E11.319 EYE EXAM (SIMPLE-NONBILLABLE)     Adult Eye Referral   2. Presbyopia  H52.4    3. Myopia of both eyes with astigmatism  H52.13 EYE EXAM (SIMPLE-NONBILLABLE)    H52.203 REFRACTION   4. Glaucoma suspect, bilateral  H40.003 Adult Eye Referral      PLAN:  Glucose control  Distance prescription or bifocal   Refer for  LTG testing, DR to [facility]   Lisa Cazares aware  eye exam results will be sent to Mark Peter OD           Again, thank you for allowing me to participate in the care of your patient.        Sincerely,        Emy Sibley, OD

## 2021-08-03 NOTE — PATIENT INSTRUCTIONS
Patient Education   Diabetes weakens the blood vessels all over the body, including the eyes. Damage to the blood vessels in the eyes can cause swelling or bleeding into part of the eye (called the retina). This is called diabetic retinopathy (LAST-tin-AH-puh-thee). If not treated, this disease can cause vision loss or blindness.   Symptoms may include blurred or distorted vision, but many people have no symptoms. It's important to see your eye doctor regularly to check for problems.   Early treatment and good control can help protect your vision. Here are the things you can do to help prevent vision loss:      1. Keep your blood sugar levels under tight control.      2. Bring high blood pressure under control.      3. No smoking.      4. Have yearly dilated eye exams.      Patient Education     What Is Diabetic Retinopathy?  Diabetic retinopathy is the main cause of blindness in adults. It happens when diabetes harms blood vessels in the eye. These weak vessels leak fluid into a part of the eye called the retina. New blood vessels can break and bleed into the retina. Old blood vessels can leak and cause swelling. These vessels can damage parts of the retina. This can cause blurry, distorted vision.     What causes diabetic retinopathy?  Diabetes is the cause of this eye disease. Over time, diabetes weakens blood vessels all over the body, even in the eyes. Poor blood sugar control can make it worse. So can:    Smoking    High cholesterol    High blood pressure    Pregnancy  This health problem happens more often in Hispanics and in  Americans.   What are the symptoms?  You can have diabetic retinopathy without knowing it. There is often no pain and no outward sign. Over time, you may notice blurring or some vision loss. Some people have trouble seeing at night or see spots or floaters. Symptoms may come and go. Early treatment and good control of risk factors may help prevent vision loss or blindness.  What  can you do?  Have your eyes checked at least once a year by an eye specialist. Your healthcare provider will tell you when and how often you need these exams. You can also help control your diabetes through:    Exercise    Diet    Medicine, if needed  If you already have diabetic retinopathy, these same steps may help you control it, too. But don't do exercises that raise your blood pressure quickly.  [employer] last reviewed this educational content on 11/1/2019 2000-2021 [employer]. All rights reserved. This information is not intended as a substitute for professional medical care. Always follow your healthcare professional's instructions.    Will send to [facility] for

## 2021-08-17 ENCOUNTER — APPOINTMENT (OUTPATIENT)
Dept: OPTOMETRY | Facility: CLINIC | Age: 51
End: 2021-08-17
Payer: COMMERCIAL

## 2021-08-17 PROCEDURE — 92341 FIT SPECTACLES BIFOCAL: CPT | Performed by: OPTOMETRIST

## 2021-08-31 ENCOUNTER — TRANSFERRED RECORDS (OUTPATIENT)
Dept: HEALTH INFORMATION MANAGEMENT | Facility: CLINIC | Age: 51
End: 2021-08-31

## 2021-12-09 DIAGNOSIS — E11.9 TYPE 2 DIABETES MELLITUS WITHOUT COMPLICATION, WITHOUT LONG-TERM CURRENT USE OF INSULIN (H): Primary | ICD-10-CM

## 2021-12-09 NOTE — TELEPHONE ENCOUNTER
.Routing refill request to provider for review/approval because:  Drug not active on patient's medication list    Patient requesting 500mg BID, has 1000mg BID on med list        CHETAN SrinivasanN RN  [facility]

## 2021-12-15 DIAGNOSIS — E11.9 TYPE 2 DIABETES MELLITUS WITHOUT COMPLICATION, WITHOUT LONG-TERM CURRENT USE OF INSULIN (H): ICD-10-CM

## 2021-12-16 NOTE — TELEPHONE ENCOUNTER
Duplicate see script sent 12/10/2021 metFORMIN (GLUCOPHAGE) 1000 MG tablet 180 tablet x 3 refills

## 2022-01-18 ENCOUNTER — OFFICE VISIT (OUTPATIENT)
Dept: FAMILY MEDICINE | Facility: CLINIC | Age: 52
End: 2022-01-18
Payer: COMMERCIAL

## 2022-01-18 VITALS
TEMPERATURE: 97.9 F | HEART RATE: 98 BPM | DIASTOLIC BLOOD PRESSURE: 86 MMHG | OXYGEN SATURATION: 96 % | SYSTOLIC BLOOD PRESSURE: 143 MMHG

## 2022-01-18 DIAGNOSIS — R05.9 COUGH: Primary | ICD-10-CM

## 2022-01-18 LAB
FLUAV AG SPEC QL IA: NEGATIVE
FLUBV AG SPEC QL IA: NEGATIVE
SARS-COV-2 RNA RESP QL NAA+PROBE: NORMAL

## 2022-01-18 PROCEDURE — 87804 INFLUENZA ASSAY W/OPTIC: CPT | Performed by: NURSE PRACTITIONER

## 2022-01-18 PROCEDURE — U0005 INFEC AGEN DETEC AMPLI PROBE: HCPCS | Performed by: NURSE PRACTITIONER

## 2022-01-18 PROCEDURE — 99215 OFFICE O/P EST HI 40 MIN: CPT | Performed by: NURSE PRACTITIONER

## 2022-01-18 RX ORDER — BENZONATATE 200 MG/1
200 CAPSULE ORAL 3 TIMES DAILY PRN
Qty: 30 CAPSULE | Refills: 0 | Status: SHIPPED | OUTPATIENT
Start: 2022-01-18 | End: 2022-03-22

## 2022-01-18 NOTE — PATIENT INSTRUCTIONS
Patient Education     Chronic Cough with Uncertain Cause (Adult)  Everyone has had a cough as part of the common cold, flu, or bronchitis. This kind of cough occurs along with an achy feeling, low-grade fever, nasal and sinus congestion, and a scratchy or sore throat. This usually gets better in 2 to 3 weeks. A cough that lasts longer than 3 weeks may be due to other causes. Your healthcare provider may refer to this as a chronic cough.   If your cough does not improve over the next 2 weeks, further testing may be needed. Follow up with your healthcare provider as advised. Cough suppressants may be recommended. Based on your exam today, the exact cause of your cough is not certain. Below are some common causes for persistent cough.   Smoker's cough  Smoker s cough doesn t go away. If you continue to smoke, it only gets worse. The cough is from irritation in the air passages. Talk to your healthcare provider about quitting. Medicines or nicotine-replacement products, like gum or the patch, may make quitting easier.   Postnasal drip  A cough that is worse at night may be due to postnasal drip. Excess mucus in the nose drains from the back of your nose to your throat. This triggers the cough reflex. Postnasal drip may be due to a sinus infection or allergy. Common allergens include dust, tobacco smoke (both inhaled and secondhand smoke), environmental pollutants, pollen, mold, pets, cleaning agents, room deodorizers, and chemical fumes. Over-the-counter antihistamines or decongestants may be helpful for allergies. A sinus infection may requires antibiotic treatment. See your healthcare provider if symptoms continue.     Medicines  Certain prescribed medicines can cause a chronic cough in some people:    ACE inhibitors for high blood pressure. These include benazepril, captopril, enalapril, fosinopril, lisinopril, quinapril, ramipril, and others.    Beta-blockers for high blood pressure and other conditions. These  include propranolol, atenolol, metoprolol, nadolol, and others.  Let your healthcare provider know if you are taking any of these. The chronic cough may mean your medicine needs to be changed.   Asthma  Cough may be the only sign of mild asthma. You may have tests to find out if asthma is causing your cough. You may also take asthma medicine on a trial basis.   Acid reflux (heartburn, GERD)  The esophagus is the tube that carries food from the mouth to the stomach. A valve at its lower end prevents stomach acids from flowing upward. If this valve does not work properly, acid from the stomach enters the esophagus. This may cause a burning pain in the upper abdomen or lower chest, belching, or cough. Symptoms are often worse when lying flat. Avoid eating or drinking before bedtime. Try using extra pillows to raise your upper body, or place 4-inch blocks under the head of your bed. You may try an over-the-counter (OTC) antacid or an acid-blocking medicine such as famotidine, cimetidine, esomeprazole, lansoprazole, or omeprazole. Stronger medicines for this condition can be prescribed by your healthcare provider. Ask your healthcare provider which OTC medicine to use. Depending on your current medicines, some OTC medicines may cause drug interactions and should be avoided.   Follow-up care  Follow up with your healthcare provider, or as advised, if your cough does not improve. Further testing may be needed.   Note: If an X-ray was taken, a specialist will review it. You will be notified of any new findings that may affect your care.   When to seek medical advice  Call your healthcare provider right away if any of these occur:    Mild wheezing or difficulty breathing    Fever of 100.4 F (38 C) or higher, or as directed by your healthcare provider    Unexpected weight loss    Coughing up large amounts of colored sputum or blood-tinged sputum    Night sweats (sheets and pajamas get soaking wet)  Call 911  Call 911 if any  of these occur:     Coughing up blood    Moderate to severe trouble breathing or wheezing  [employer] last reviewed this educational content on 6/1/2018 2000-2021 [employer]. All rights reserved. This information is not intended as a substitute for professional medical care. Always follow your healthcare professional's instructions.

## 2022-01-18 NOTE — LETTER
February 9, 2022      Lisa Cazares  [address]  [facility] [address]        Dear ,    We are writing to inform you of your test results.    Good news!  Your COVID and flu tests are negative!       Resulted Orders   Asymptomatic COVID-19 Virus (Coronavirus) by PCR Nose   Result Value Ref Range    SARS CoV2 PCR  Negative     Testing sent to reference lab. Results will be returned via unsolicited result   Influenza A & B Antigen - Clinic Collect   Result Value Ref Range    Influenza A antigen Negative Negative    Influenza B antigen Negative Negative    Narrative    Test results must be correlated with clinical data. If necessary, results should be confirmed by a molecular assay or viral culture.   Asymptomatic COVID-19 Virus (Coronavirus) by PCR Nose   Result Value Ref Range    COVID-19 Virus PCR - Result NOT DETECTED       Comment:      Not Detected    Collection of multiple specimens from the same patient may   be necessary to detect the virus. The possibility of a false   negative should be considered if the patient's recent   exposure or clinical presentation suggests 2019 nCOV   infection and diagnostic tests for other causes of illness   are negative. Repeat testing may be considered in this   setting.    Patient sample was heat inactivated and amplified using the   HDPCR(TM) SARS-CoV-2 assay ([employer] The HDPCRTM   SARS-CoV-2 assay is a reverse transcription real-time   polymerase chain reaction (qRT-PCR) test intended for the   qualitative detection of nucleic acid from SARS-CoV-2 in   human nasopharyngeal swabs, oropharyngeal swabs, anterior   nasal swabs, mid-turbinate nasal swabs as well as nasal   aspirate, nasal wash, and bronchoalveolar lavage (BAL)   specimens from individuals who are suspected of COVID-19 by   their healthcare provider.    A negative result does not rule out the presence of    real-time PCR inhibitors in the specimen or COVID-19 RNA in   concentrations below the  limit of detection of the assay.   The possibility of a false negative should be considered if   the patients recent exposure or clinical presentation   suggests COVID-19. Additional testing or repeat testing   requires consultation with the laboratory.    Nasopharyngeal specimen is the preferred choice for   swab-based SARS CoV2 testing. When collection of a   nasopharyngeal swab is not possible the following are   acceptable alternatives:  an oropharyngeal (OP) specimen collected by a healthcare   professional, or nasal mid-turbinate (NMT) swab collected by   a healthcare professional or by onsite self-collection   (using a flocked tapered swab), or an anterior nares   specimen collected by a healthcare professional or by onsite   self-collection (using a round foam swab). ([facility])    Testing performed by [facility] at   the [facility] [address] [place] [address].    The test performance characteristics were determined by   [facility]. It has not been cleared or approved by the FDA.    The laboratory is regulated under the Clinical Laboratory   Improvement Amendments of 1988 (CLIA-88) as qualified to   perform high-complexity testing. This test is used for   clinical purposes. It should not be regarded as   investigational or for research.       If you have any questions or concerns, please call the clinic at the number listed above.       Sincerely,      OBI Moss CNP

## 2022-01-18 NOTE — PROGRESS NOTES
Assessment & Plan     Cough  No cough present during her visit, overall patient appears healthy.  I do not think an x-ray is indicated at this time.  I discussed with patient likely residual symptoms from his previous upper respiratory infection.  He would like COVID and influenza testing today and I think that is reasonable, though I have low suspicion.  Advise symptomatic control including humidifiers, over-the-counter cough medicines, and Tessalon.  I also considered GERD however the patient does not feel his symptoms are related to any sort of reflux.  - benzonatate (TESSALON) 200 MG capsule; Take 1 capsule (200 mg) by mouth 3 times daily as needed for cough  - Asymptomatic COVID-19 Virus (Coronavirus) by PCR Nose  - Influenza A & B Antigen - Clinic Collect    Diagnosis or treatment significantly limited by social determinants of health - language barrier- phone  utilized  Ordering of each unique test  Prescription drug management  I spent a total of 40 minutes on the day of the visit.   Time spent doing chart review, history and exam, documentation and further activities per the note           Return for Follow up, Routine preventive.    OBI Aimn CNP  [facility]    Subjective   Lisa gabriel is a 51 year old who presents for the following health issues  accompanied by his spouse.    HPI     Acute Illness  Acute illness concerns: cough  Onset/Duration: one month ago on and off  Symptoms:  Fever: no  Chills/Sweats: YES just cold   Headache (location?): YES slight headache  Sinus Pressure: no  Conjunctivitis:  no  Ear Pain: no  Rhinorrhea: no  Congestion:no  Sore Throat: no  Cough: no  Wheeze: no  Decreased Appetite: no  Nausea: YES  Vomiting: YES two to three times   Diarrhea: YES  Dysuria/Freq.:  Dysuria or Hematuria: no   Fatigue/Achiness: YES feel cold and weakness all over body  Sick/Strep Exposure: no  Therapies tried and outcome: used tyenol did not help         Sometimes after coughing feels discomfort in chest.  Coughing most of the time at night, not during the day.  Wants X-ray.  Was sick with cold few weeks  ago.  No reflux.      Review of Systems   Constitutional, HEENT, cardiovascular, pulmonary, gi and gu systems are negative, except as otherwise noted.      Objective    There were no vitals taken for this visit.  There is no height or weight on file to calculate BMI.  Physical Exam   GENERAL: healthy, alert and no distress  EYES: Eyes grossly normal to inspection, PERRL and conjunctivae and sclerae normal  NECK: no adenopathy, no asymmetry, masses, or scars and thyroid normal to palpation  RESP: lungs clear to auscultation - no rales, rhonchi or wheezes  CV: regular rate and rhythm, normal S1 S2, no S3 or S4, no murmur, click or rub, no peripheral edema and peripheral pulses strong  MS: no gross musculoskeletal defects noted, no edema

## 2022-01-19 LAB — SARS-COV-2 RNA RESP QL NAA+PROBE: NOT DETECTED

## 2022-01-20 ENCOUNTER — TELEPHONE (OUTPATIENT)
Dept: FAMILY MEDICINE | Facility: CLINIC | Age: 52
End: 2022-01-20
Payer: COMMERCIAL

## 2022-01-20 NOTE — TELEPHONE ENCOUNTER
----- Message from Sammy Gunter DO sent at 1/20/2022  9:38 AM CST -----  Pls khushbu patient and inform him that flu and covid testing was negative. Thank you!    Sammy Gunter DO

## 2022-01-20 NOTE — TELEPHONE ENCOUNTER
Spoke with Andriy and informed that Lisa is negative for flu and COVID.    Halina Arenas RN  [facility]

## 2022-03-14 DIAGNOSIS — E11.9 TYPE 2 DIABETES MELLITUS WITHOUT COMPLICATION, WITHOUT LONG-TERM CURRENT USE OF INSULIN (H): ICD-10-CM

## 2022-03-17 NOTE — TELEPHONE ENCOUNTER
Patient is scheduled for an appointment on 3/22/2022, wondering if his medication can be approved to last until seen. Also instructed to see if the pharmacy can dispense a temporary amount. Please assist. Thanks!

## 2022-03-22 ENCOUNTER — OFFICE VISIT (OUTPATIENT)
Dept: FAMILY MEDICINE | Facility: CLINIC | Age: 52
End: 2022-03-22
Payer: COMMERCIAL

## 2022-03-22 VITALS
RESPIRATION RATE: 16 BRPM | HEART RATE: 89 BPM | DIASTOLIC BLOOD PRESSURE: 62 MMHG | HEIGHT: 69 IN | BODY MASS INDEX: 26.36 KG/M2 | OXYGEN SATURATION: 98 % | SYSTOLIC BLOOD PRESSURE: 124 MMHG | TEMPERATURE: 97.5 F | WEIGHT: 178 LBS

## 2022-03-22 DIAGNOSIS — K52.9 CHRONIC DIARRHEA: ICD-10-CM

## 2022-03-22 DIAGNOSIS — E11.9 TYPE 2 DIABETES MELLITUS WITHOUT COMPLICATION, WITHOUT LONG-TERM CURRENT USE OF INSULIN (H): Primary | ICD-10-CM

## 2022-03-22 DIAGNOSIS — I10 BENIGN ESSENTIAL HYPERTENSION: ICD-10-CM

## 2022-03-22 DIAGNOSIS — Z23 ENCOUNTER FOR ADMINISTRATION OF VACCINE: ICD-10-CM

## 2022-03-22 DIAGNOSIS — E11.319 DIABETIC RETINOPATHY OF BOTH EYES WITHOUT MACULAR EDEMA ASSOCIATED WITH TYPE 2 DIABETES MELLITUS, UNSPECIFIED RETINOPATHY SEVERITY (H): ICD-10-CM

## 2022-03-22 LAB
ANION GAP SERPL CALCULATED.3IONS-SCNC: 9 MMOL/L (ref 3–14)
BUN SERPL-MCNC: 16 MG/DL (ref 7–30)
CALCIUM SERPL-MCNC: 10.2 MG/DL (ref 8.5–10.1)
CHLORIDE BLD-SCNC: 105 MMOL/L (ref 94–109)
CHOLEST SERPL-MCNC: 85 MG/DL
CO2 SERPL-SCNC: 24 MMOL/L (ref 20–32)
CREAT SERPL-MCNC: 0.85 MG/DL (ref 0.66–1.25)
FASTING STATUS PATIENT QL REPORTED: YES
GFR SERPL CREATININE-BSD FRML MDRD: >90 ML/MIN/1.73M2
GLUCOSE BLD-MCNC: 107 MG/DL (ref 70–99)
HBA1C MFR BLD: 6.3 % (ref 0–5.6)
HDLC SERPL-MCNC: 42 MG/DL
LDLC SERPL CALC-MCNC: 19 MG/DL
NONHDLC SERPL-MCNC: 43 MG/DL
POTASSIUM BLD-SCNC: 4.3 MMOL/L (ref 3.4–5.3)
SODIUM SERPL-SCNC: 138 MMOL/L (ref 133–144)
TRIGL SERPL-MCNC: 122 MG/DL

## 2022-03-22 PROCEDURE — 82043 UR ALBUMIN QUANTITATIVE: CPT | Performed by: NURSE PRACTITIONER

## 2022-03-22 PROCEDURE — 90715 TDAP VACCINE 7 YRS/> IM: CPT | Performed by: NURSE PRACTITIONER

## 2022-03-22 PROCEDURE — 90471 IMMUNIZATION ADMIN: CPT | Performed by: NURSE PRACTITIONER

## 2022-03-22 PROCEDURE — 80048 BASIC METABOLIC PNL TOTAL CA: CPT | Performed by: NURSE PRACTITIONER

## 2022-03-22 PROCEDURE — 36415 COLL VENOUS BLD VENIPUNCTURE: CPT | Performed by: NURSE PRACTITIONER

## 2022-03-22 PROCEDURE — 99214 OFFICE O/P EST MOD 30 MIN: CPT | Mod: 25 | Performed by: NURSE PRACTITIONER

## 2022-03-22 PROCEDURE — 80061 LIPID PANEL: CPT | Performed by: NURSE PRACTITIONER

## 2022-03-22 PROCEDURE — 83036 HEMOGLOBIN GLYCOSYLATED A1C: CPT | Performed by: NURSE PRACTITIONER

## 2022-03-22 RX ORDER — ATORVASTATIN CALCIUM 10 MG/1
10 TABLET, FILM COATED ORAL DAILY
Qty: 90 TABLET | Refills: 3 | Status: SHIPPED | OUTPATIENT
Start: 2022-03-22 | End: 2023-04-24

## 2022-03-22 NOTE — PROGRESS NOTES
med  Assessment & Plan     Type 2 diabetes mellitus without complication, without long-term current use of insulin (H)  Controlled on current dose of Metformin.  A1c 6.3 today.  - metFORMIN (GLUCOPHAGE) 1000 MG tablet; Take 1 tablet (1,000 mg) by mouth 2 times daily (with meals)  - Hemoglobin A1c; Future  - Lipid panel reflex to direct LDL Fasting; Future  - atorvastatin (LIPITOR) 10 MG tablet; Take 1 tablet (10 mg) by mouth daily  - Hemoglobin A1c  - Lipid panel reflex to direct LDL Fasting    Diabetic retinopathy of both eyes without macular edema associated with type 2 diabetes mellitus, unspecified retinopathy severity (H)  Following with ophthalmology, continue tight glucose control.  - metFORMIN (GLUCOPHAGE) 1000 MG tablet; Take 1 tablet (1,000 mg) by mouth 2 times daily (with meals)  - Hemoglobin A1c; Future  - Lipid panel reflex to direct LDL Fasting; Future  - Hemoglobin A1c  - Lipid panel reflex to direct LDL Fasting    Chronic diarrhea  We discussed this could be a side effect of Metformin.  Patient would like to do stool samples to rule out infectious etiology.  For symptoms continue we can refer to GI for further management.  - Enteric Bacteria and Virus Panel by ESA Stool; Future  - Clostridium difficile Toxin B PCR; Future  - Ova and Parasite Exam Routine; Future    Benign essential hypertension  Well-controlled.  Continue low dose of lisinopril.  - Basic metabolic panel  (Ca, Cl, CO2, Creat, Gluc, K, Na, BUN); Future  - Albumin Random Urine Quantitative with Creat Ratio; Future  - Basic metabolic panel  (Ca, Cl, CO2, Creat, Gluc, K, Na, BUN)  - Albumin Random Urine Quantitative with Creat Ratio    Encounter for administration of vaccine  - HEPATITIS B VACCINE,ADULT,IM  - TDAP VACCINE (Adacel, Boostrix)  [[id number]]    Diagnosis or treatment significantly limited by social determinants of health - language barrier  Prescription drug management  I spent a total of 30 minutes on the day of the visit.    "Time spent doing chart review, history and exam, documentation and further activities per the note       BMI:   Estimated body mass index is 26.29 kg/m  as calculated from the following:    Height as of this encounter: 1.753 m (5' 9\").    Weight as of this encounter: 80.7 kg (178 lb).   Weight management plan: Discussed healthy diet and exercise guidelines        Return in about 6 months (around 2022) for Routine preventive.    OBI Amin [facility]    Subjective   Lisa gabriel is a 52 year old who presents for the following health issues  accompanied by his spouse.    HPI     Medication Followup of Metformin    Taking Medication as prescribed: yes    Side Effects:  None    Medication Helping Symptoms:  yes     Presents today for Metformin refill.  He is fasting today.      Diarrhea: several times a day 3-4 times a day.  Sometimes normal.  His father  of colon cancer.  He had a previous colonoscopy in [place] 4 years ago which was normal.      Review of Systems   Constitutional, HEENT, cardiovascular, pulmonary, gi and gu systems are negative, except as otherwise noted.      Objective    There were no vitals taken for this visit.  There is no height or weight on file to calculate BMI.  Physical Exam   GENERAL: healthy, alert and no distress  MS: no gross musculoskeletal defects noted, no edema  Diabetic foot exam: normal DP and PT pulses, no trophic changes or ulcerative lesions and normal sensory exam                "

## 2022-03-23 LAB
CREAT UR-MCNC: 175 MG/DL
MICROALBUMIN UR-MCNC: 57 MG/L
MICROALBUMIN/CREAT UR: 32.57 MG/G CR (ref 0–17)

## 2022-03-24 ENCOUNTER — LAB (OUTPATIENT)
Dept: LAB | Facility: CLINIC | Age: 52
End: 2022-03-24
Payer: COMMERCIAL

## 2022-03-24 DIAGNOSIS — K52.9 CHRONIC DIARRHEA: ICD-10-CM

## 2022-03-24 LAB — C DIFF TOX B STL QL: NEGATIVE

## 2022-03-24 PROCEDURE — 87177 OVA AND PARASITES SMEARS: CPT

## 2022-03-24 PROCEDURE — 87506 IADNA-DNA/RNA PROBE TQ 6-11: CPT | Mod: 59

## 2022-03-24 PROCEDURE — 87209 SMEAR COMPLEX STAIN: CPT

## 2022-03-24 PROCEDURE — 87493 C DIFF AMPLIFIED PROBE: CPT

## 2022-03-25 LAB
C COLI+JEJUNI+LARI FUSA STL QL NAA+PROBE: NOT DETECTED
EC STX1 GENE STL QL NAA+PROBE: NOT DETECTED
EC STX2 GENE STL QL NAA+PROBE: NOT DETECTED
NOROV GI+II ORF1-ORF2 JNC STL QL NAA+PR: NOT DETECTED
O+P STL MICRO: NEGATIVE
RVA NSP5 STL QL NAA+PROBE: NOT DETECTED
SALMONELLA SP RPOD STL QL NAA+PROBE: NOT DETECTED
SHIGELLA SP+EIEC IPAH STL QL NAA+PROBE: NOT DETECTED
V CHOL+PARA RFBL+TRKH+TNAA STL QL NAA+PR: NOT DETECTED
Y ENTERO RECN STL QL NAA+PROBE: NOT DETECTED

## 2022-03-27 ENCOUNTER — HEALTH MAINTENANCE LETTER (OUTPATIENT)
Age: 52
End: 2022-03-27

## 2022-04-04 ENCOUNTER — TRANSFERRED RECORDS (OUTPATIENT)
Dept: MULTI SPECIALTY CLINIC | Facility: CLINIC | Age: 52
End: 2022-04-04
Payer: COMMERCIAL

## 2022-04-15 ENCOUNTER — TELEPHONE (OUTPATIENT)
Dept: FAMILY MEDICINE | Facility: CLINIC | Age: 52
End: 2022-04-15
Payer: COMMERCIAL

## 2022-04-15 NOTE — TELEPHONE ENCOUNTER
Patient is unable to come to his vaccine appt on Tuesday because he doesn't have transportation. Appt cancelled. He will call back to reschedule.      Jocelyn Hu RN  [facility]

## 2022-05-17 ENCOUNTER — TELEPHONE (OUTPATIENT)
Dept: FAMILY MEDICINE | Facility: CLINIC | Age: 52
End: 2022-05-17
Payer: COMMERCIAL

## 2022-05-17 DIAGNOSIS — E11.9 TYPE 2 DIABETES MELLITUS WITHOUT COMPLICATION, WITHOUT LONG-TERM CURRENT USE OF INSULIN (H): Primary | ICD-10-CM

## 2022-05-17 DIAGNOSIS — E11.9 TYPE 2 DIABETES MELLITUS WITHOUT COMPLICATION, WITHOUT LONG-TERM CURRENT USE OF INSULIN (H): ICD-10-CM

## 2022-05-17 DIAGNOSIS — E11.319 DIABETIC RETINOPATHY OF BOTH EYES WITHOUT MACULAR EDEMA ASSOCIATED WITH TYPE 2 DIABETES MELLITUS, UNSPECIFIED RETINOPATHY SEVERITY (H): ICD-10-CM

## 2022-05-17 NOTE — TELEPHONE ENCOUNTER
"Per 5/17/22 orders only encounter:  \"Early refill of metformin sent to pharmacy- patient wast taking prescription incorrectly.  Instructed to only take TWO tablets daily regardless of how much he is eating.\"    SHIRA Grossman, Patient Representative, huddled with writer.    Patient's friend Andriy presented to  stating patient is out of Metformin due to taking 3 tablets per day because blood sugars have been elevated and patient has other questions/concerns.  NANY Godoy CNP, sent in new RX for Metformin and asked RN to follow up with patient.    Writer called patient with [employer]  #[phone]:    Left message to call back and ask to speak with an available triage nurse.  CLAYTON Chirinos, RN  [facility]                "

## 2022-05-17 NOTE — PROGRESS NOTES
Early refill of metformin sent to pharmacy- patient wast taking prescription incorrectly.  Instructed to only take TWO tablets daily regardless of how much he is eating.

## 2022-05-18 NOTE — TELEPHONE ENCOUNTER
"Answered call from patient spouse ALLISON Ashraf on file.  Caller inquiring about metformin refill.  We sent a short term refill on 5/17 but caller states pharmacy will not fill.      Called [facility] on [address] who report that insurance is denying the fill, including ability to pay cash, as patient should not yet be out of medication.    Called Andriy and informed of the need to call insurance.  Due to language barrier, writer is unsure message has been understood.    Called insurance ([employer]) and informed of the misdosing situation.  Agent was able to override the denial.    Called pharmacy and informed they may run the rx though again.  It has been approved for $4.32 and will be ready by 1600 today.    Called pt himself with  #[phone].    Pt informed of prescription being ready today.  Reinforced to patient that this medication is taken twice per day with meals, and is not adjusted daily per blood sugar readings.    Pt reports that his BS on 5/15/22 was 148 \"on an empty stomach.\" Only checks blood sugar once per week.  Denies further diarrhea.  Explained to patient that excess metformin can lead to diarrhea, and patient states, \"That makes sense.\"    Pt confirms that he can access [email or web address].  Requested patient take his blood glucose three times per week and send us this information via [email or web address] at the end of the month.  Pt states he will do this.    Instructed to request next refill as per normal and he will receive 90 days as he did before.  Today's fill is for 30 days.    Pt expressed thanks for the detailed explanation.    YAN Arenas RN  [facility]          "

## 2022-05-19 NOTE — TELEPHONE ENCOUNTER
"We [facility] with message to prescriber \"The patient says she's taking 3ts per day. Please fax us back with a new prescription with current instructions\"    I called Veronika to confirm prescription is correct as is. Should be taking TWO tablets per day. See 5/17 Orders Only encounter.     Also sent patient mychart message reinforcing the two tablets daily.     CLAYTON Srinivasan RN  [facility]    "

## 2022-06-14 DIAGNOSIS — R80.9 MICROALBUMINURIA: ICD-10-CM

## 2022-06-14 DIAGNOSIS — E11.9 TYPE 2 DIABETES MELLITUS WITHOUT COMPLICATION, WITHOUT LONG-TERM CURRENT USE OF INSULIN (H): ICD-10-CM

## 2022-06-15 RX ORDER — LISINOPRIL 2.5 MG/1
2.5 TABLET ORAL DAILY
Qty: 90 TABLET | Refills: 2 | Status: SHIPPED | OUTPATIENT
Start: 2022-06-15 | End: 2022-09-23

## 2022-06-15 NOTE — TELEPHONE ENCOUNTER
Kamille-Please review, sign if agree and may close encounter.  Medication was last prescribed by patient's previous PCP.    Thank you!  CHETAN ChirinosN, RN  [facility]

## 2022-06-17 DIAGNOSIS — E11.9 TYPE 2 DIABETES MELLITUS WITHOUT COMPLICATION, WITHOUT LONG-TERM CURRENT USE OF INSULIN (H): ICD-10-CM

## 2022-06-17 DIAGNOSIS — E11.319 DIABETIC RETINOPATHY OF BOTH EYES WITHOUT MACULAR EDEMA ASSOCIATED WITH TYPE 2 DIABETES MELLITUS, UNSPECIFIED RETINOPATHY SEVERITY (H): ICD-10-CM

## 2022-06-20 DIAGNOSIS — E11.9 TYPE 2 DIABETES MELLITUS WITHOUT COMPLICATION, WITHOUT LONG-TERM CURRENT USE OF INSULIN (H): ICD-10-CM

## 2022-06-20 DIAGNOSIS — R80.9 MICROALBUMINURIA: ICD-10-CM

## 2022-06-22 RX ORDER — LISINOPRIL 2.5 MG/1
TABLET ORAL
Qty: 90 TABLET | Refills: 2 | OUTPATIENT
Start: 2022-06-22

## 2022-06-22 NOTE — TELEPHONE ENCOUNTER
Message sent to pharmacy - Refusal reason: Should already have refills on file (ORDER SENT TO REQUESTING JANELLE [phone] 6/15/22 FOR 9 MO SUPPLY).  Sandy ORTEGA

## 2022-07-17 ENCOUNTER — HEALTH MAINTENANCE LETTER (OUTPATIENT)
Age: 52
End: 2022-07-17

## 2022-09-21 DIAGNOSIS — E11.9 TYPE 2 DIABETES MELLITUS WITHOUT COMPLICATION, WITHOUT LONG-TERM CURRENT USE OF INSULIN (H): ICD-10-CM

## 2022-09-21 DIAGNOSIS — R80.9 MICROALBUMINURIA: ICD-10-CM

## 2022-09-23 RX ORDER — LISINOPRIL 2.5 MG/1
TABLET ORAL
Qty: 90 TABLET | Refills: 0 | Status: SHIPPED | OUTPATIENT
Start: 2022-09-23 | End: 2023-02-28

## 2022-09-23 NOTE — TELEPHONE ENCOUNTER
"Juanita Refill:   Medication is being filled for 1 time refill only due to:  Patient needs to be seen because: \"Return in about 6 months (around 9/22/2022) for Routine preventive.\"    Scheduling:   Please contact patient to schedule an annual preventative. Thank you.     Kera Ayala, CHETANN RN  [facility]    "

## 2022-09-24 ENCOUNTER — HEALTH MAINTENANCE LETTER (OUTPATIENT)
Age: 52
End: 2022-09-24

## 2023-01-29 ENCOUNTER — HEALTH MAINTENANCE LETTER (OUTPATIENT)
Age: 53
End: 2023-01-29

## 2023-01-30 DIAGNOSIS — E11.9 TYPE 2 DIABETES MELLITUS WITHOUT COMPLICATION, WITHOUT LONG-TERM CURRENT USE OF INSULIN (H): ICD-10-CM

## 2023-02-02 RX ORDER — ATORVASTATIN CALCIUM 10 MG/1
TABLET, FILM COATED ORAL
Qty: 90 TABLET | Refills: 3 | OUTPATIENT
Start: 2023-02-02

## 2023-02-06 ENCOUNTER — NURSE TRIAGE (OUTPATIENT)
Dept: FAMILY MEDICINE | Facility: CLINIC | Age: 53
End: 2023-02-06
Payer: COMMERCIAL

## 2023-02-06 NOTE — TELEPHONE ENCOUNTER
Patient's friend, Andriy Quispe, called asking if patient has appt tomorrow?  Consent to communicate on file.    1. Patient has toe pain for a couple days with swelling    Writer told Andriy Quispe writer will call patient with ; background noise too loud and writer unable to understand caller.    Writer called patient with [employer]  #[phone]:  1. Six or seven years of toe pain  2. Big toe is edematous and toenail falls off each time it grows back   A. This is on left foot  3. Recently a smaller toe has edema  4. Right foot has a toenail that fell off  5. Denied any other symptoms  6. Able to walk but it is painful  7. No drainage from toes  8. No injury  9. Thinks toe changes are related to diabetes  10. Would like appt tomorrow    Lab Results   Component Value Date    A1C 6.3 03/22/2022    A1C 6.5 04/28/2021    A1C 7.3 01/12/2021    A1C 6.6 10/15/2020     Offered appt on 2/7/23 at 1040 with Dr. Gunter.  Visit date, time and location confirmed with patient and friend with whom patient asked writer to confirm appt information.      Patient verbalized understanding and in agreement with plan.      Reason for Disposition    Looks like a boil, infected sore, deep ulcer, or other infected rash (spreading redness, pus)    Additional Information    Negative: Followed an injury    Negative: Wound looks infected    Negative: Caused by an animal bite    Negative: Caused by frostbite    Negative: Athlete's Foot suspected (i.e., itchy red rash in web space between toes)    Negative: Foot pain is main symptom    Negative: Foot is cool or blue in comparison to other foot    Negative: Purple or black skin on toe  (Exception: Simple recalled injury with bruise.)    Negative: Patient sounds very sick or weak to the triager    Negative: SEVERE pain (e.g., excruciating, unable to do any normal activities) and not improved after 2 hours of pain medicine    Protocols used: TOE PAIN-PRISCILA Hernandez, CHETANN, RN-BC  [facility]  [facility]

## 2023-02-07 ENCOUNTER — OFFICE VISIT (OUTPATIENT)
Dept: FAMILY MEDICINE | Facility: CLINIC | Age: 53
End: 2023-02-07
Payer: COMMERCIAL

## 2023-02-07 ENCOUNTER — ANCILLARY PROCEDURE (OUTPATIENT)
Dept: GENERAL RADIOLOGY | Facility: CLINIC | Age: 53
End: 2023-02-07
Attending: FAMILY MEDICINE
Payer: COMMERCIAL

## 2023-02-07 VITALS
HEART RATE: 80 BPM | RESPIRATION RATE: 15 BRPM | BODY MASS INDEX: 25.62 KG/M2 | WEIGHT: 179 LBS | DIASTOLIC BLOOD PRESSURE: 62 MMHG | OXYGEN SATURATION: 98 % | HEIGHT: 70 IN | SYSTOLIC BLOOD PRESSURE: 130 MMHG | TEMPERATURE: 97.8 F

## 2023-02-07 DIAGNOSIS — L03.032 CELLULITIS OF TOE OF LEFT FOOT: Primary | ICD-10-CM

## 2023-02-07 DIAGNOSIS — M79.675 PAIN OF TOE OF LEFT FOOT: ICD-10-CM

## 2023-02-07 DIAGNOSIS — T14.8XXA FRICTION BLISTERS OF THE SKIN: ICD-10-CM

## 2023-02-07 DIAGNOSIS — L60.8: ICD-10-CM

## 2023-02-07 PROCEDURE — 99214 OFFICE O/P EST MOD 30 MIN: CPT | Performed by: FAMILY MEDICINE

## 2023-02-07 PROCEDURE — 73660 X-RAY EXAM OF TOE(S): CPT | Mod: TC | Performed by: RADIOLOGY

## 2023-02-07 RX ORDER — CEPHALEXIN 500 MG/1
500 CAPSULE ORAL 2 TIMES DAILY
Qty: 14 CAPSULE | Refills: 0 | Status: SHIPPED | OUTPATIENT
Start: 2023-02-07 | End: 2023-02-14

## 2023-02-07 ASSESSMENT — PATIENT HEALTH QUESTIONNAIRE - PHQ9
SUM OF ALL RESPONSES TO PHQ QUESTIONS 1-9: 3
10. IF YOU CHECKED OFF ANY PROBLEMS, HOW DIFFICULT HAVE THESE PROBLEMS MADE IT FOR YOU TO DO YOUR WORK, TAKE CARE OF THINGS AT HOME, OR GET ALONG WITH OTHER PEOPLE: SOMEWHAT DIFFICULT
SUM OF ALL RESPONSES TO PHQ QUESTIONS 1-9: 3

## 2023-02-07 NOTE — PROGRESS NOTES
"  Assessment & Plan     Cellulitis of toe of left foot  Pain of toe of left foot  Friction blisters of the skin  -Open friction blister lateral left 4th digit, likely source of superficial skin infection.  -Discussed course of abx for infection  -Given tenderness over base of 4th metatarsal will obtain xray to look for any signs of joint inflammation or other acute changes  -Follow-up precautions discussed  - cephALEXin (KEFLEX) 500 MG capsule  Dispense: 14 capsule; Refill: 0      Spontaneous shedding of nail from nail matrix  -Likely due to either constant nail trauma or fungal nail infection  -Discussed that nail will like continue to either fail to grow back or continue to grow back abnormally    35 minutes spent on the date of the encounter doing chart review, history and exam, documentation and further activities per the note.    Sammy Gunter DO  [facility]    Subjective   Lisa gabriel is a 52 year old accompanied by his niece who helps to interpret, presenting for the following health issues:  Toe Pain (Swelling )    Patient with history of DM2.    Here for on week of pain of left 4th toe. States no history of injury or trauma. Wears non-slip closed-toed shoes for work.    Has had loss of nail matrix over both of his great toes for several years. No new symptoms. Wants to know why this is occurring.    Denies numbness, tingling, weakness, fever, chills.       Review of Systems         Objective    /62   Pulse 80   Temp 97.8  F (36.6  C) (Temporal)   Resp 15   Ht 1.78 m (5' 10.08\")   Wt 81.2 kg (179 lb)   SpO2 98%   BMI 25.63 kg/m    Body mass index is 25.63 kg/m .  Physical Exam   GENERAL: healthy, alert and no distress  RESP: lungs clear to auscultation - no rales, rhonchi or wheezes  MS: b/l great toe nail beds with patches of missing nail.   LEFT 4TH TOE: Tenderness at base of left 4th metatarsal. Mild warmth, edema and erythema of 4th toe. Pin-tip area of bleeding on " lateral 4th toe appears to be superficial, possibly blister or friction injury  PSYCH: mentation appears normal, affect normal/bright

## 2023-02-08 ENCOUNTER — TELEPHONE (OUTPATIENT)
Dept: FAMILY MEDICINE | Facility: CLINIC | Age: 53
End: 2023-02-08
Payer: COMMERCIAL

## 2023-02-08 NOTE — TELEPHONE ENCOUNTER
Writer called patient with a Mandarin  #[phone]:  1. Reviewed message per Dr. Gunter    Patient verbalized understanding and in agreement with plan.    CHETAN ChirinosN, RN-BC  [facility]

## 2023-02-08 NOTE — TELEPHONE ENCOUNTER
----- Message from Sammy Gunter DO sent at 2/8/2023 11:13 AM CST -----  Pls call pt and inform him that his xray is normal.    Sammy Gunter DO

## 2023-02-27 ENCOUNTER — NURSE TRIAGE (OUTPATIENT)
Dept: FAMILY MEDICINE | Facility: CLINIC | Age: 53
End: 2023-02-27
Payer: COMMERCIAL

## 2023-02-27 NOTE — TELEPHONE ENCOUNTER
Patient was seen in clinic for cellulitis of his toe.  Placed on Cephalexin 500 mg, 2 times daily for 7 days.  Finished medication and still has pain and   Getting worse again and is swollen on that toe.  Nail came off and the new one has not come back yet.    Per patient he was told he had a fungal infection and a bacterial infection.  Patient is also diabetic.  Advised patient to be seen   Patient was offered appointment urgent care today or UC or an appointment for tomorrow.  Appointment scheduled for tomorrow afternoon with Sheron Alegre and patient will cancel if he decides to go to Urgent Care.  Stefanie Plasencia RN  [facility]

## 2023-02-28 ENCOUNTER — OFFICE VISIT (OUTPATIENT)
Dept: FAMILY MEDICINE | Facility: CLINIC | Age: 53
End: 2023-02-28
Payer: COMMERCIAL

## 2023-02-28 VITALS
HEIGHT: 70 IN | RESPIRATION RATE: 15 BRPM | TEMPERATURE: 98.3 F | SYSTOLIC BLOOD PRESSURE: 145 MMHG | HEART RATE: 83 BPM | OXYGEN SATURATION: 96 % | WEIGHT: 175 LBS | DIASTOLIC BLOOD PRESSURE: 66 MMHG | BODY MASS INDEX: 25.05 KG/M2

## 2023-02-28 DIAGNOSIS — L03.032 CELLULITIS OF TOE OF LEFT FOOT: Primary | ICD-10-CM

## 2023-02-28 DIAGNOSIS — E11.9 TYPE 2 DIABETES MELLITUS WITHOUT COMPLICATION, WITHOUT LONG-TERM CURRENT USE OF INSULIN (H): ICD-10-CM

## 2023-02-28 DIAGNOSIS — E11.319 DIABETIC RETINOPATHY OF BOTH EYES WITHOUT MACULAR EDEMA ASSOCIATED WITH TYPE 2 DIABETES MELLITUS, UNSPECIFIED RETINOPATHY SEVERITY (H): ICD-10-CM

## 2023-02-28 DIAGNOSIS — I10 BENIGN ESSENTIAL HYPERTENSION: ICD-10-CM

## 2023-02-28 DIAGNOSIS — Z13.220 SCREENING FOR HYPERLIPIDEMIA: ICD-10-CM

## 2023-02-28 DIAGNOSIS — R80.9 MICROALBUMINURIA: ICD-10-CM

## 2023-02-28 LAB — HBA1C MFR BLD: 6.4 % (ref 0–5.6)

## 2023-02-28 PROCEDURE — 99214 OFFICE O/P EST MOD 30 MIN: CPT | Performed by: NURSE PRACTITIONER

## 2023-02-28 PROCEDURE — 36415 COLL VENOUS BLD VENIPUNCTURE: CPT | Performed by: NURSE PRACTITIONER

## 2023-02-28 PROCEDURE — 83036 HEMOGLOBIN GLYCOSYLATED A1C: CPT | Performed by: NURSE PRACTITIONER

## 2023-02-28 PROCEDURE — 82043 UR ALBUMIN QUANTITATIVE: CPT | Performed by: NURSE PRACTITIONER

## 2023-02-28 PROCEDURE — 82570 ASSAY OF URINE CREATININE: CPT | Performed by: NURSE PRACTITIONER

## 2023-02-28 PROCEDURE — 80061 LIPID PANEL: CPT | Performed by: NURSE PRACTITIONER

## 2023-02-28 PROCEDURE — 80048 BASIC METABOLIC PNL TOTAL CA: CPT | Performed by: NURSE PRACTITIONER

## 2023-02-28 RX ORDER — SULFAMETHOXAZOLE/TRIMETHOPRIM 800-160 MG
1 TABLET ORAL 2 TIMES DAILY
Qty: 20 TABLET | Refills: 0 | Status: SHIPPED | OUTPATIENT
Start: 2023-02-28 | End: 2023-03-10

## 2023-02-28 RX ORDER — LISINOPRIL 5 MG/1
5 TABLET ORAL DAILY
Qty: 90 TABLET | Refills: 1 | Status: SHIPPED | OUTPATIENT
Start: 2023-02-28 | End: 2023-09-15

## 2023-02-28 RX ORDER — MUPIROCIN 20 MG/G
OINTMENT TOPICAL 2 TIMES DAILY
Qty: 15 G | Refills: 0 | Status: SHIPPED | OUTPATIENT
Start: 2023-02-28 | End: 2024-01-02

## 2023-02-28 ASSESSMENT — PAIN SCALES - GENERAL: PAINLEVEL: NO PAIN (0)

## 2023-02-28 NOTE — PROGRESS NOTES
"  Assessment & Plan     (L03.032) Cellulitis of toe of left foot  (primary encounter diagnosis)  Comment:   Plan: Orthopedic  Referral, mupirocin         (BACTROBAN) 2 % external ointment,         sulfamethoxazole-trimethoprim (BACTRIM DS)         800-160 MG tablet        Will try a different antibiotic.  He will follow up if symptoms are not improving in the next 48-72 hours or sooner if symptoms worsen.  Referral to podiatry also given.     (E11.319) Diabetic retinopathy of both eyes without macular edema associated with type 2 diabetes mellitus, unspecified retinopathy severity (H)  Comment:   Plan: HEMOGLOBIN A1C, BASIC METABOLIC PANEL, Lipid         panel reflex to direct LDL Non-fasting, Albumin        Random Urine Quantitative with Creat Ratio            (Z13.220) Screening for hyperlipidemia  Comment:   Plan: Lipid panel reflex to direct LDL Non-fasting            (E11.9) Type 2 diabetes mellitus without complication, without long-term current use of insulin (H)  Comment:   Plan: lisinopril (ZESTRIL) 5 MG tablet            (R80.9) Microalbuminuria  Comment:   Plan: lisinopril (ZESTRIL) 5 MG tablet            (I10) Benign essential hypertension  Comment: not at goal  Plan: lisinopril (ZESTRIL) 5 MG tablet        Will increase Lisinopril to 5 mg and follow up in one month.            BMI:   Estimated body mass index is 25.47 kg/m  as calculated from the following:    Height as of this encounter: 1.765 m (5' 9.5\").    Weight as of this encounter: 79.4 kg (175 lb).           Return in about 3 months (around 5/28/2023) for Follow up.    Elisa Alegre NP  [facility]   Lisa gabriel is a 53 year old, presenting for the following health issues:  infected toe      History of Present Illness       Reason for visit:  Infected toe    He eats 0-1 servings of fruits and vegetables daily.He consumes 0 sweetened beverage(s) daily.He exercises with enough effort to increase " "his heart rate 10 to 19 minutes per day.  He exercises with enough effort to increase his heart rate 7 days per week.   He is taking medications regularly.     He was seen in clinic three weeks ago and treated with Keflex for cellulitis of his left 4th toe.  This helped, but symptoms never completely resolved and now that he has been off, his symptoms have been worsening.  He has increased pain and redness.  He denies any fevers.  He does have type 2 diabetes.          Review of Systems         Objective    BP (!) 145/66 (BP Location: Right arm, Patient Position: Sitting, Cuff Size: Adult Regular)   Pulse 83   Temp 98.3  F (36.8  C) (Oral)   Resp 15   Ht 1.765 m (5' 9.5\")   Wt 79.4 kg (175 lb)   SpO2 96%   BMI 25.47 kg/m    Body mass index is 25.47 kg/m .  Physical Exam   GENERAL: healthy, alert and no distress  SKIN: edema and mild erythema left 4th tow; healing lesion lateral aspect of 4th toe  NEURO: Normal strength and tone, mentation intact and speech normal  PSYCH: mentation appears normal, affect normal/bright                    "

## 2023-03-01 LAB
ANION GAP SERPL CALCULATED.3IONS-SCNC: 12 MMOL/L (ref 7–15)
BUN SERPL-MCNC: 11.2 MG/DL (ref 6–20)
CALCIUM SERPL-MCNC: 9.6 MG/DL (ref 8.6–10)
CHLORIDE SERPL-SCNC: 102 MMOL/L (ref 98–107)
CHOLEST SERPL-MCNC: 101 MG/DL
CREAT SERPL-MCNC: 0.81 MG/DL (ref 0.67–1.17)
CREAT UR-MCNC: 155 MG/DL
DEPRECATED HCO3 PLAS-SCNC: 25 MMOL/L (ref 22–29)
GFR SERPL CREATININE-BSD FRML MDRD: >90 ML/MIN/1.73M2
GLUCOSE SERPL-MCNC: 198 MG/DL (ref 70–99)
HDLC SERPL-MCNC: 41 MG/DL
LDLC SERPL CALC-MCNC: 46 MG/DL
MICROALBUMIN UR-MCNC: 63.1 MG/L
MICROALBUMIN/CREAT UR: 40.71 MG/G CR (ref 0–17)
NONHDLC SERPL-MCNC: 60 MG/DL
POTASSIUM SERPL-SCNC: 4 MMOL/L (ref 3.4–5.3)
SODIUM SERPL-SCNC: 139 MMOL/L (ref 136–145)
TRIGL SERPL-MCNC: 68 MG/DL

## 2023-04-24 DIAGNOSIS — E11.9 TYPE 2 DIABETES MELLITUS WITHOUT COMPLICATION, WITHOUT LONG-TERM CURRENT USE OF INSULIN (H): ICD-10-CM

## 2023-04-24 RX ORDER — ATORVASTATIN CALCIUM 10 MG/1
TABLET, FILM COATED ORAL
Qty: 90 TABLET | Refills: 3 | Status: SHIPPED | OUTPATIENT
Start: 2023-04-24 | End: 2024-04-23

## 2023-04-24 NOTE — TELEPHONE ENCOUNTER
Atorvastatin Refilled per [facility] refill protocol    Emy Vogel, BSN RN  [facility]

## 2023-05-18 NOTE — PROGRESS NOTES
Clinic Care Coordination Contact  [facility]/Voicemail       Clinical Data: Care Coordinator Outreach  Outreach attempted x 1.  Left message on patient's voicemail with call back information and requested return call.  Plan: . Care Coordinator will try to reach patient again in 1-2 business days.       DISCHARGE SUMMARY  NAME:  V[address]imir St [id number]  YOB: 1937  ADMITTED:  5/17/2023  8:27 AM  DISCHARGED:  5/18/2023    ADMISSION DIAGNOSIS:  Atrial fibrillation    DISCHARGE DIAGNOSIS:  AF s/p Amulet     PROBLEM LIST AT DISCHARGE:  Patient Active Problem List   Diagnosis    Atherosclerosis of native coronary artery of native heart without angina pectoris    Coronary atherosclerosis of autologous vein bypass graft    Benign essential HTN    Bilateral high frequency sensorineural hearing loss    Ingrowing nail    Other specified disease of nail    Pain in limb    Other hammer toe (acquired)    Personal history of squamous cell carcinoma on the right dorsal forearm excised in 10/2015    Dizziness    PVC's (premature ventricular contractions)    S/P CABG (coronary artery bypass graft)    Polycythemia    Chronic pain of both shoulders    NSTEMI (non-ST elevated myocardial infarction) (CMD)    Hyperlipemia, mixed    Severe Intracranial carotid stenosis, left    Occlusion of right vertebral artery    History of ischemic left ICA stroke    Left atrial enlargement    Coronary artery disease with stable angina pectoris (CMD)    Chronic kidney disease (CKD), stage III (moderate) (CMD)    SOB (shortness of breath)    Chronic systolic heart failure (CMD)    Chest pain, unspecified type    Ascending aorta dilatation (CMD)    Pulmonary hypertension (CMD)       ALLERGIES:   Allergen Reactions    Bextra [Valdecoxib] HIVES and RASH    Statins Other (See Comments)     Leg cramps.      Sulfa Antibiotics HIVES     Bextra    Ciprofloxacin Other (See Comments)     \"I tore tendons in my shoulder\"       PROCEDURES DURING HOSPITALIZATION:  Amulet 5/17/23  Successful LAAO with a 20 mm Amplatzer Amulet device. Appendage was known to be shallow and as such several tug tests were performed to confirm stability prior to release.      Antithrombotic plan  Aspirin 81 mg daily and Plavix 75 mg daily. Discontinue Eliquis.      Echo  5/18/23  Focused Echo: Pericardial Effusion s/p Amulet (5/17/2023).  Amulet LAAO device visualized in left atrial appendage.  No pericardial effusion.    HOSPITAL COURSE:  The patient was admitted for the purposes of Amulet insertion.  The patient had successful Amulet.  Echo without effusion.  Labs stable.  Figure 8 stitch removed with no complication. The patient was hemodynamically stable and ready for discharge.    Vitals:    05/18/23 0440   BP: (!) 151/71   Pulse: 89   Resp: 18   Temp: 98 °F (36.7 °C)       Rhythm:  Normal Sinus Rhythm    LABORATORY Results:    Lab Results   Component Value Date    SODIUM 137 05/17/2023    POTASSIUM 3.8 05/17/2023    BUN 27 (H) 05/17/2023    CREATININE 1.55 (H) 05/17/2023    WBC 8.4 05/17/2023    HCT 42.3 05/17/2023    HGB 13.8 05/17/2023     05/17/2023    INR 0.9 10/23/2017     01/25/2023    RAPDTR 0.18 (HH) 10/24/2017    PTT 35 (H) 10/25/2017    GLUCOSE 106 (H) 05/17/2023    TSH 3.563 07/24/2018    BNP 49 10/23/2017    CHOLESTEROL 112 12/14/2022    HDL 42 12/14/2022    CALCLDL 54 12/14/2022    TRIGLYCERIDE 82 12/14/2022    MG 2.0 05/16/2016       DISCHARGE PHYSICAL EXAMINATION:  General Appearance:  alert, cooperative and no distress  Heart:  regular rate and rhythm, S1, S2 normal, no murmur, click, rub or gallop  Lungs:  clear to auscultation bilaterally  Abdomen:  soft, non-tender; bowel sounds normal; no masses,  no organomegaly  Extremities:  extremities normal, atraumatic, no cyanosis or edema - scattered ecchymosis throughout  Pulses:  Right Dorsalis Pedis - present 1+ and Left Dorsalis Pedis - present 1+  Neurological:  Grossly normal    DISCHARGE MEDICATION LIST:  See AVS (after visit summary).    DISCHARGE INSTRUCTIONS:  No heavy lifting or driving for 48 hours.  Follow low cholesterol, heart healthy diet.    FOLLOW-UP INSTRUCTIONS:  Follow-up with on 06/30/2023.  Stop Eliquis.  Start-appt with aspirin and Plavix.       Patient was discharged in  satisfactory condition.    Emily A Ollerman, CNP 5/18/2023       Patient seen and examined with the fellow and cardiology team.  Agree with the history and physical, past medical history, surgical history, social history, and review of systems.  Medication list, vitals, and lab reviewed.  I have reviewed and edited the above note as needed.  I attest that I performed all of the medical decision-making for the \"substantive portion\" of this visit.    Doing well.  no more CP  Groin soft  Ok to dc home      Fidel Avalos MD   Cardiology

## 2023-06-04 ENCOUNTER — HEALTH MAINTENANCE LETTER (OUTPATIENT)
Age: 53
End: 2023-06-04

## 2023-06-20 DIAGNOSIS — E11.9 TYPE 2 DIABETES MELLITUS WITHOUT COMPLICATION, WITHOUT LONG-TERM CURRENT USE OF INSULIN (H): ICD-10-CM

## 2023-06-20 DIAGNOSIS — E11.319 DIABETIC RETINOPATHY OF BOTH EYES WITHOUT MACULAR EDEMA ASSOCIATED WITH TYPE 2 DIABETES MELLITUS, UNSPECIFIED RETINOPATHY SEVERITY (H): ICD-10-CM

## 2023-06-22 NOTE — TELEPHONE ENCOUNTER
---Prescription approved per [facility] Refill Protocol.       Ernestine Schultz RN BSN     [facility]        --Last visit:  2/28/2023     --Future Visit: none.

## 2023-08-05 ENCOUNTER — HEALTH MAINTENANCE LETTER (OUTPATIENT)
Age: 53
End: 2023-08-05

## 2023-08-07 ENCOUNTER — TELEPHONE (OUTPATIENT)
Dept: FAMILY MEDICINE | Facility: CLINIC | Age: 53
End: 2023-08-07
Payer: COMMERCIAL

## 2023-08-07 NOTE — TELEPHONE ENCOUNTER
Called patient with [employer]  # [phone] on line.  Patient friend who helps him with his appointments (Andriy Garrett) answered and made appointment for tomorrow afternoon with Kamille.  Patient was not available for direct triage.  Would like to be seen for his blood pressure and dizziness.    Stefanie Plasencia RN  [facility]

## 2023-08-07 NOTE — TELEPHONE ENCOUNTER
Reason for Call:  Appointment Request    Patient requesting this type of appt:  Dizziness and Blood pressure issue    Requested provider: Kamille Godoy    Reason patient unable to be scheduled: Not within requested timeframe    When does patient want to be seen/preferred time: 1-2 days    Comments: declined triage- wants to be seen tomorrow    Could we send this information to you in [facility] or would you prefer to receive a phone call?:   Patient would prefer a phone call   Okay to leave a detailed message?: Yes at Other phone number:  [phone]    Call taken on 8/7/2023 at 11:12 AM by Camilla SCHMIDT

## 2023-08-08 ENCOUNTER — OFFICE VISIT (OUTPATIENT)
Dept: FAMILY MEDICINE | Facility: CLINIC | Age: 53
End: 2023-08-08
Payer: COMMERCIAL

## 2023-08-08 ENCOUNTER — ANCILLARY PROCEDURE (OUTPATIENT)
Dept: GENERAL RADIOLOGY | Facility: CLINIC | Age: 53
End: 2023-08-08
Attending: NURSE PRACTITIONER
Payer: COMMERCIAL

## 2023-08-08 VITALS
SYSTOLIC BLOOD PRESSURE: 120 MMHG | RESPIRATION RATE: 15 BRPM | HEART RATE: 81 BPM | DIASTOLIC BLOOD PRESSURE: 76 MMHG | BODY MASS INDEX: 25.62 KG/M2 | OXYGEN SATURATION: 98 % | TEMPERATURE: 97.8 F | WEIGHT: 173 LBS | HEIGHT: 69 IN

## 2023-08-08 DIAGNOSIS — G89.29 CHRONIC NONINTRACTABLE HEADACHE, UNSPECIFIED HEADACHE TYPE: Primary | ICD-10-CM

## 2023-08-08 DIAGNOSIS — E11.319 TYPE 2 DIABETES MELLITUS WITH RETINOPATHY, WITHOUT LONG-TERM CURRENT USE OF INSULIN, MACULAR EDEMA PRESENCE UNSPECIFIED, UNSPECIFIED LATERALITY, UNSPECIFIED RETINOPATHY SEVERITY (H): ICD-10-CM

## 2023-08-08 DIAGNOSIS — M79.674 PAIN OF TOE OF RIGHT FOOT: ICD-10-CM

## 2023-08-08 DIAGNOSIS — E11.319 DIABETIC RETINOPATHY OF BOTH EYES WITHOUT MACULAR EDEMA ASSOCIATED WITH TYPE 2 DIABETES MELLITUS, UNSPECIFIED RETINOPATHY SEVERITY (H): ICD-10-CM

## 2023-08-08 DIAGNOSIS — R51.9 CHRONIC NONINTRACTABLE HEADACHE, UNSPECIFIED HEADACHE TYPE: Primary | ICD-10-CM

## 2023-08-08 DIAGNOSIS — R80.9 MICROALBUMINURIA: ICD-10-CM

## 2023-08-08 DIAGNOSIS — I10 BENIGN ESSENTIAL HYPERTENSION: ICD-10-CM

## 2023-08-08 DIAGNOSIS — M10.072 ACUTE IDIOPATHIC GOUT INVOLVING TOE OF LEFT FOOT: ICD-10-CM

## 2023-08-08 LAB
ANION GAP SERPL CALCULATED.3IONS-SCNC: 10 MMOL/L (ref 7–15)
BUN SERPL-MCNC: 15.7 MG/DL (ref 6–20)
CALCIUM SERPL-MCNC: 9.7 MG/DL (ref 8.6–10)
CHLORIDE SERPL-SCNC: 101 MMOL/L (ref 98–107)
CREAT SERPL-MCNC: 0.93 MG/DL (ref 0.67–1.17)
CREAT UR-MCNC: 166 MG/DL
DEPRECATED HCO3 PLAS-SCNC: 27 MMOL/L (ref 22–29)
GFR SERPL CREATININE-BSD FRML MDRD: >90 ML/MIN/1.73M2
GLUCOSE SERPL-MCNC: 119 MG/DL (ref 70–99)
HBA1C MFR BLD: 7.2 % (ref 0–5.6)
MICROALBUMIN UR-MCNC: 43.7 MG/L
MICROALBUMIN/CREAT UR: 26.33 MG/G CR (ref 0–17)
POTASSIUM SERPL-SCNC: 5.1 MMOL/L (ref 3.4–5.3)
SODIUM SERPL-SCNC: 138 MMOL/L (ref 136–145)
URATE SERPL-MCNC: 7.5 MG/DL (ref 3.4–7)

## 2023-08-08 PROCEDURE — 73660 X-RAY EXAM OF TOE(S): CPT | Mod: TC | Performed by: RADIOLOGY

## 2023-08-08 PROCEDURE — 80048 BASIC METABOLIC PNL TOTAL CA: CPT | Performed by: NURSE PRACTITIONER

## 2023-08-08 PROCEDURE — 99214 OFFICE O/P EST MOD 30 MIN: CPT | Performed by: NURSE PRACTITIONER

## 2023-08-08 PROCEDURE — 83036 HEMOGLOBIN GLYCOSYLATED A1C: CPT | Performed by: NURSE PRACTITIONER

## 2023-08-08 PROCEDURE — 82043 UR ALBUMIN QUANTITATIVE: CPT | Performed by: NURSE PRACTITIONER

## 2023-08-08 PROCEDURE — 84550 ASSAY OF BLOOD/URIC ACID: CPT | Performed by: NURSE PRACTITIONER

## 2023-08-08 PROCEDURE — 82570 ASSAY OF URINE CREATININE: CPT | Performed by: NURSE PRACTITIONER

## 2023-08-08 PROCEDURE — 36415 COLL VENOUS BLD VENIPUNCTURE: CPT | Performed by: NURSE PRACTITIONER

## 2023-08-08 NOTE — PROGRESS NOTES
Assessment & Plan     Chronic nonintractable headache, unspecified headache type  BP is normal. He does get relief with tylenol.  Possibly tension headache vs migraine.  I did recommend he get an eye exam as this could also be contributing.  Recommended PRN tylenol and we will follow up after his eye exam. Will consider prophylactic vs triptan if he continues to have symptoms.    Pain of toe of right foot  Suspect possible gout.  Uric acid pending, x-ray negative for fracture or signs of osteomyelitis. Pending uric acid, will send in allopurinol and possible treatment for gout flare.  Recommend tylenol as stated above  - XR Toe Left G/E 2 Views; Future  - Uric acid; Future  - Uric acid    Diabetic retinopathy of both eyes without macular edema associated with type 2 diabetes mellitus, unspecified retinopathy severity (H)  - Hemoglobin A1c; Future  - Basic metabolic panel  (Ca, Cl, CO2, Creat, Gluc, K, Na, BUN); Future  - Albumin Random Urine Quantitative with Creat Ratio; Future  - Adult Eye  Referral; Future  - Hemoglobin A1c  - Basic metabolic panel  (Ca, Cl, CO2, Creat, Gluc, K, Na, BUN)  - Albumin Random Urine Quantitative with Creat Ratio    Type 2 diabetes mellitus with retinopathy, without long-term current use of insulin, macular edema presence unspecified, unspecified laterality, unspecified retinopathy severity (H)  - Hemoglobin A1c; Future  - Basic metabolic panel  (Ca, Cl, CO2, Creat, Gluc, K, Na, BUN); Future  - Albumin Random Urine Quantitative with Creat Ratio; Future  - Adult Eye  Referral; Future  - Hemoglobin A1c  - Basic metabolic panel  (Ca, Cl, CO2, Creat, Gluc, K, Na, BUN)  - Albumin Random Urine Quantitative with Creat Ratio    Benign essential hypertension  - Basic metabolic panel  (Ca, Cl, CO2, Creat, Gluc, K, Na, BUN); Future  - Albumin Random Urine Quantitative with Creat Ratio; Future  - Home Blood Pressure Monitor Order for DME - ONLY FOR DME  - Basic metabolic panel   "(Ca, Cl, CO2, Creat, Gluc, K, Na, BUN)  - Albumin Random Urine Quantitative with Creat Ratio    Ordering of each unique test  Prescription drug management         BMI:   Estimated body mass index is 25.62 kg/m  as calculated from the following:    Height as of this encounter: 1.75 m (5' 8.9\").    Weight as of this encounter: 78.5 kg (173 lb).           OBI Amin CNP  [facility]   Lisa gabriel is a 53 year old, presenting for the following health issues:  Diabetes and Tumor Board      History of Present Illness       Reason for visit:  Blood Pressure    He eats 4 or more servings of fruits and vegetables daily.He consumes 4 sweetened beverage(s) daily.He exercises with enough effort to increase his heart rate 60 or more minutes per day.  He exercises with enough effort to increase his heart rate 7 days per week.   He is taking medications regularly.     Back of head hurts,  Sometimes feels dizzy and nauseous.  Like the whole room is spinning.  And if in a hurry, almost feels like he will black out.    In the past couple of weeks, not always.  No vision changes  Feels like its pounding, last about 5 minutes when it happens.    If he hits if a few times, gets better, but then starts to hurt.  Symptoms started few years.  Occurring every day.    Has a diabetes, HLD, HTN.    Uses tylenol if really bad, it does help.              Review of Systems   Constitutional, HEENT, cardiovascular, pulmonary, gi and gu systems are negative, except as otherwise noted.      Objective    /76 (BP Location: Right arm, Patient Position: Sitting, Cuff Size: Adult Regular)   Pulse 81   Temp 97.8  F (36.6  C) (Temporal)   Resp 15   Ht 1.75 m (5' 8.9\")   Wt 78.5 kg (173 lb)   SpO2 98%   BMI 25.62 kg/m    Body mass index is 25.62 kg/m .  Physical Exam   GENERAL: healthy, alert and no distress  MS: no gross musculoskeletal defects noted, no edema  NEURO: Normal strength and tone, " mentation intact and speech normal.  CN II-XII intact  Diabetic foot exam: normal DP and PT pulses, no trophic changes or ulcerative lesions, and normal sensory exam

## 2023-08-14 NOTE — TELEPHONE ENCOUNTER
FUTURE VISIT INFORMATION      FUTURE VISIT INFORMATION:  Date: 10/31/23  Time: 2:20pm  Location: [facility]  REFERRAL INFORMATION:  Referring provider:  Kamille Godoy APRN CNP   Referring providers clinic:  [facility]  Reason for visit/diagnosis  Diabetic retinopathy of both eyes without macular edema associated with type 2 diabetes mellitus, unspecified retinopathy severity (H)     RECORDS REQUESTED FROM:       Clinic name Comments Records Status Imaging Status   [facility] OV/referral 8/8/23 [facility]    [facility] 8/3/21 [facility]

## 2023-08-15 RX ORDER — GLIPIZIDE 5 MG/1
5 TABLET, FILM COATED, EXTENDED RELEASE ORAL DAILY
Qty: 90 TABLET | Refills: 0 | Status: SHIPPED | OUTPATIENT
Start: 2023-08-15 | End: 2023-11-21

## 2023-08-15 RX ORDER — ALLOPURINOL 100 MG/1
100 TABLET ORAL DAILY
Qty: 90 TABLET | Refills: 0 | Status: SHIPPED | OUTPATIENT
Start: 2023-08-15 | End: 2023-11-14

## 2023-09-15 DIAGNOSIS — E11.9 TYPE 2 DIABETES MELLITUS WITHOUT COMPLICATION, WITHOUT LONG-TERM CURRENT USE OF INSULIN (H): ICD-10-CM

## 2023-09-15 DIAGNOSIS — R80.9 MICROALBUMINURIA: ICD-10-CM

## 2023-09-15 DIAGNOSIS — I10 BENIGN ESSENTIAL HYPERTENSION: ICD-10-CM

## 2023-09-15 RX ORDER — LISINOPRIL 5 MG/1
5 TABLET ORAL DAILY
Qty: 90 TABLET | Refills: 2 | Status: SHIPPED | OUTPATIENT
Start: 2023-09-15 | End: 2024-06-03

## 2023-09-21 DIAGNOSIS — E11.9 TYPE 2 DIABETES MELLITUS WITHOUT COMPLICATION, WITHOUT LONG-TERM CURRENT USE OF INSULIN (H): ICD-10-CM

## 2023-09-21 DIAGNOSIS — E11.319 DIABETIC RETINOPATHY OF BOTH EYES WITHOUT MACULAR EDEMA ASSOCIATED WITH TYPE 2 DIABETES MELLITUS, UNSPECIFIED RETINOPATHY SEVERITY (H): ICD-10-CM

## 2023-10-27 ENCOUNTER — TELEPHONE (OUTPATIENT)
Dept: OPHTHALMOLOGY | Facility: CLINIC | Age: 53
End: 2023-10-27
Payer: COMMERCIAL

## 2023-10-27 NOTE — TELEPHONE ENCOUNTER
LVM for patient confirming appointment on 10/31/23. Provided Eye Clinic number for any scheduling conflicts. -Via  call

## 2023-10-31 ENCOUNTER — PRE VISIT (OUTPATIENT)
Dept: OPHTHALMOLOGY | Facility: CLINIC | Age: 53
End: 2023-10-31

## 2023-11-11 DIAGNOSIS — M10.072 ACUTE IDIOPATHIC GOUT INVOLVING TOE OF LEFT FOOT: ICD-10-CM

## 2023-11-14 RX ORDER — ALLOPURINOL 100 MG/1
100 TABLET ORAL DAILY
Qty: 90 TABLET | Refills: 0 | Status: SHIPPED | OUTPATIENT
Start: 2023-11-14 | End: 2023-11-21

## 2023-11-21 ENCOUNTER — OFFICE VISIT (OUTPATIENT)
Dept: FAMILY MEDICINE | Facility: CLINIC | Age: 53
End: 2023-11-21
Payer: COMMERCIAL

## 2023-11-21 VITALS
HEIGHT: 68 IN | BODY MASS INDEX: 27.69 KG/M2 | OXYGEN SATURATION: 100 % | WEIGHT: 182.7 LBS | HEART RATE: 71 BPM | RESPIRATION RATE: 16 BRPM | SYSTOLIC BLOOD PRESSURE: 134 MMHG | DIASTOLIC BLOOD PRESSURE: 78 MMHG | TEMPERATURE: 98.2 F

## 2023-11-21 DIAGNOSIS — N48.89 PENILE MASS: ICD-10-CM

## 2023-11-21 DIAGNOSIS — M10.072 ACUTE IDIOPATHIC GOUT INVOLVING TOE OF LEFT FOOT: ICD-10-CM

## 2023-11-21 DIAGNOSIS — Z00.00 ROUTINE GENERAL MEDICAL EXAMINATION AT A HEALTH CARE FACILITY: Primary | ICD-10-CM

## 2023-11-21 DIAGNOSIS — R80.9 MICROALBUMINURIA: ICD-10-CM

## 2023-11-21 DIAGNOSIS — I10 BENIGN ESSENTIAL HYPERTENSION: ICD-10-CM

## 2023-11-21 DIAGNOSIS — E11.319 TYPE 2 DIABETES MELLITUS WITH RETINOPATHY, WITHOUT LONG-TERM CURRENT USE OF INSULIN, MACULAR EDEMA PRESENCE UNSPECIFIED, UNSPECIFIED LATERALITY, UNSPECIFIED RETINOPATHY SEVERITY (H): ICD-10-CM

## 2023-11-21 LAB
ANION GAP SERPL CALCULATED.3IONS-SCNC: 11 MMOL/L (ref 7–15)
BUN SERPL-MCNC: 12 MG/DL (ref 6–20)
CALCIUM SERPL-MCNC: 9.9 MG/DL (ref 8.6–10)
CHLORIDE SERPL-SCNC: 102 MMOL/L (ref 98–107)
CREAT SERPL-MCNC: 0.86 MG/DL (ref 0.67–1.17)
CREAT UR-MCNC: 198 MG/DL
DEPRECATED HCO3 PLAS-SCNC: 27 MMOL/L (ref 22–29)
EGFRCR SERPLBLD CKD-EPI 2021: >90 ML/MIN/1.73M2
GLUCOSE SERPL-MCNC: 182 MG/DL (ref 70–99)
HBA1C MFR BLD: 8.6 % (ref 0–5.6)
MICROALBUMIN UR-MCNC: 287 MG/L
MICROALBUMIN/CREAT UR: 144.95 MG/G CR (ref 0–17)
POTASSIUM SERPL-SCNC: 4.7 MMOL/L (ref 3.4–5.3)
SODIUM SERPL-SCNC: 140 MMOL/L (ref 135–145)
URATE SERPL-MCNC: 5.1 MG/DL (ref 3.4–7)

## 2023-11-21 PROCEDURE — 83036 HEMOGLOBIN GLYCOSYLATED A1C: CPT | Performed by: NURSE PRACTITIONER

## 2023-11-21 PROCEDURE — 91320 SARSCV2 VAC 30MCG TRS-SUC IM: CPT | Performed by: NURSE PRACTITIONER

## 2023-11-21 PROCEDURE — 90472 IMMUNIZATION ADMIN EACH ADD: CPT | Performed by: NURSE PRACTITIONER

## 2023-11-21 PROCEDURE — 90480 ADMN SARSCOV2 VAC 1/ONLY CMP: CPT | Performed by: NURSE PRACTITIONER

## 2023-11-21 PROCEDURE — 84550 ASSAY OF BLOOD/URIC ACID: CPT | Performed by: NURSE PRACTITIONER

## 2023-11-21 PROCEDURE — 90746 HEPB VACCINE 3 DOSE ADULT IM: CPT | Performed by: NURSE PRACTITIONER

## 2023-11-21 PROCEDURE — 90677 PCV20 VACCINE IM: CPT | Performed by: NURSE PRACTITIONER

## 2023-11-21 PROCEDURE — 90471 IMMUNIZATION ADMIN: CPT | Performed by: NURSE PRACTITIONER

## 2023-11-21 PROCEDURE — 82043 UR ALBUMIN QUANTITATIVE: CPT | Performed by: NURSE PRACTITIONER

## 2023-11-21 PROCEDURE — 82570 ASSAY OF URINE CREATININE: CPT | Performed by: NURSE PRACTITIONER

## 2023-11-21 PROCEDURE — 36415 COLL VENOUS BLD VENIPUNCTURE: CPT | Performed by: NURSE PRACTITIONER

## 2023-11-21 PROCEDURE — 99214 OFFICE O/P EST MOD 30 MIN: CPT | Mod: 25 | Performed by: NURSE PRACTITIONER

## 2023-11-21 PROCEDURE — 90750 HZV VACC RECOMBINANT IM: CPT | Performed by: NURSE PRACTITIONER

## 2023-11-21 PROCEDURE — 99396 PREV VISIT EST AGE 40-64: CPT | Mod: 25 | Performed by: NURSE PRACTITIONER

## 2023-11-21 PROCEDURE — 90682 RIV4 VACC RECOMBINANT DNA IM: CPT | Performed by: NURSE PRACTITIONER

## 2023-11-21 PROCEDURE — 80048 BASIC METABOLIC PNL TOTAL CA: CPT | Performed by: NURSE PRACTITIONER

## 2023-11-21 RX ORDER — METFORMIN HYDROCHLORIDE EXTENDED-RELEASE TABLETS 1000 MG/1
2000 TABLET, FILM COATED, EXTENDED RELEASE ORAL
Qty: 180 TABLET | Refills: 1 | Status: SHIPPED | OUTPATIENT
Start: 2023-11-21 | End: 2023-11-30

## 2023-11-21 RX ORDER — ALLOPURINOL 300 MG/1
300 TABLET ORAL DAILY
Qty: 90 TABLET | Refills: 1 | Status: SHIPPED | OUTPATIENT
Start: 2023-11-21 | End: 2024-06-03

## 2023-11-21 ASSESSMENT — ENCOUNTER SYMPTOMS
PARESTHESIAS: 0
WEAKNESS: 0
HEMATURIA: 0
DYSURIA: 0
SORE THROAT: 0
ARTHRALGIAS: 1
NERVOUS/ANXIOUS: 0
FEVER: 0
SHORTNESS OF BREATH: 0
COUGH: 0
NAUSEA: 0
DIZZINESS: 0
HEADACHES: 0
HEARTBURN: 0
MYALGIAS: 1
CONSTIPATION: 0
DIARRHEA: 0
HEMATOCHEZIA: 0
JOINT SWELLING: 1
EYE PAIN: 0
FREQUENCY: 0
PALPITATIONS: 0
ABDOMINAL PAIN: 0
CHILLS: 0

## 2023-11-21 ASSESSMENT — PAIN SCALES - GENERAL: PAINLEVEL: NO PAIN (0)

## 2023-11-21 NOTE — PROGRESS NOTES
Prior to immunization administration, verified patients identity using patient s name and date of birth. Please see Immunization Activity for additional information.     Screening Questionnaire for Adult Immunization    Are you sick today?   No   Do you have allergies to medications, food, a vaccine component or latex?   No   Have you ever had a serious reaction after receiving a vaccination?   No   Do you have a long-term health problem with heart, lung, kidney, or metabolic disease (e.g., diabetes), asthma, a blood disorder, no spleen, complement component deficiency, a cochlear implant, or a spinal fluid leak?  Are you on long-term aspirin therapy?   Yes, diabetes   Do you have cancer, leukemia, HIV/AIDS, or any other immune system problem?   No   Do you have a parent, brother, or sister with an immune system problem?   No   In the past 3 months, have you taken medications that affect  your immune system, such as prednisone, other steroids, or anticancer drugs; drugs for the treatment of rheumatoid arthritis, Crohn s disease, or psoriasis; or have you had radiation treatments?   No   Have you had a seizure, or a brain or other nervous system problem?   No   During the past year, have you received a transfusion of blood or blood    products, or been given immune (gamma) globulin or antiviral drug?   No   For women: Are you pregnant or is there a chance you could become       pregnant during the next month?   No   Have you received any vaccinations in the past 4 weeks?   No     Immunization questionnaire was positive for at least one answer.  Notified Kamille Godoy CNP.      Patient instructed to remain in clinic for 15 minutes afterwards, and to report any adverse reactions.     Screening performed by Derek Aguilar RN on 11/21/2023 at 2:30 PM.

## 2023-11-21 NOTE — PROGRESS NOTES
"SUBJECTIVE:   Lisa gabriel is a 53 year old, presenting for the following:  Physical (Diabetes )        11/21/2023     1:41 PM   Additional Questions   Roomed by Gabrielal medrano   Accompanied by gladys       Healthy Habits:     Getting at least 3 servings of Calcium per day:  Yes    Bi-annual eye exam:  Yes    Dental care twice a year:  Yes    Sleep apnea or symptoms of sleep apnea:  None    Diet:  Regular (no restrictions)    Frequency of exercise:  6-7 days/week    Duration of exercise:  30-45 minutes    Taking medications regularly:  Yes    Medication side effects:  Not applicable    Additional concerns today:  Yes        PROBLEMS TO ADD ON...  Feels like there is something inside his penis that feels like a \"grain of rice\".  Painful with palpation and with erection, also affecting his ability to have an erection.  Notes his urine stream to have less force, but still one stream.    Gout has not been very well controlled, having pain in left great toe and knuckles with tophi.        Social History     Tobacco Use    Smoking status: Never    Smokeless tobacco: Never   Substance Use Topics    Alcohol use: Never         11/21/2023     1:40 PM   Alcohol Use   Prescreen: >3 drinks/day or >7 drinks/week? Not Applicable          No data to display                Last PSA:   PSA   Date Value Ref Range Status   04/28/2021 0.59 0 - 4 ug/L Final     Comment:     Assay Method:  Chemiluminescence using Siemens Vista analyzer       Reviewed orders with patient. Reviewed health maintenance and updated orders accordingly - Yes  Lab work is in process    Reviewed and updated as needed this visit by clinical staff   Tobacco  Allergies  Meds              Reviewed and updated as needed this visit by Provider                     Review of Systems   Constitutional:  Negative for chills and fever.   HENT:  Negative for congestion, ear pain, hearing loss and sore throat.    Eyes:  Positive for visual disturbance. Negative for pain.   Respiratory:  " "Negative for cough and shortness of breath.    Cardiovascular:  Negative for chest pain, palpitations and peripheral edema.   Gastrointestinal:  Negative for abdominal pain, constipation, diarrhea, heartburn, hematochezia and nausea.   Genitourinary:  Negative for dysuria, frequency, genital sores, hematuria, impotence, penile discharge and urgency.   Musculoskeletal:  Positive for arthralgias, joint swelling and myalgias.   Skin:  Negative for rash.   Neurological:  Negative for dizziness, weakness, headaches and paresthesias.   Psychiatric/Behavioral:  Negative for mood changes. The patient is not nervous/anxious.          OBJECTIVE:   BP (!) 145/81 (BP Location: Right arm, Patient Position: Sitting, Cuff Size: Adult Regular)   Pulse 72   Temp 98.2  F (36.8  C) (Temporal)   Resp 16   Ht 1.732 m (5' 8.2\")   Wt 82.9 kg (182 lb 11.2 oz)   SpO2 99%   BMI 27.62 kg/m      Physical Exam  GENERAL: healthy, alert and no distress  EYES: Eyes grossly normal to inspection, PERRL and conjunctivae and sclerae normal  HENT: ear canals and TM's normal, nose and mouth without ulcers or lesions  NECK: no adenopathy, no asymmetry, masses, or scars and thyroid normal to palpation  RESP: lungs clear to auscultation - no rales, rhonchi or wheezes  CV: regular rate and rhythm, normal S1 S2, no S3 or S4, no murmur, click or rub, no peripheral edema and peripheral pulses strong  ABDOMEN: soft, nontender, no hepatosplenomegaly, no masses and bowel sounds normal   (male): testicles normal without atrophy or masses, no hernias, and small penile/urethral mass.  MS: gout tophi on knuckles of bilateral hands, and left great toe.  SKIN: no suspicious lesions or rashes  NEURO: Normal strength and tone, mentation intact and speech normal  PSYCH: mentation appears normal, affect normal/bright  Diabetic foot exam: normal DP and PT pulses, no trophic changes or ulcerative lesions, and decreased sensory exam at bilateral big toes. "       ASSESSMENT/PLAN:   (Z00.00) Routine general medical examination at a health care facility  (primary encounter diagnosis)  Comment: Reviewed medical/social/family history and health maintenance   Plan:     (E11.319) Type 2 diabetes mellitus with retinopathy, without long-term current use of insulin, macular edema presence unspecified, unspecified laterality, unspecified retinopathy severity (H)  Comment: Not controlled, A1C is 8.6.  Will change his metformin to XR and will try to get ozempic approved, stop glucatrol for now, will titrate ozempic up.  Agreeable to follow up with MTM.  Recheck A1C in 3 months.    Plan: metFORMIN (FORTAMET) 1000 MG 24 hr tablet,         semaglutide (OZEMPIC) 2 MG/3ML pen, Med Therapy        Management Referral, **Hemoglobin A1c FUTURE         3mo            (N48.89) Penile mass  Comment: Possible stone vs soft tissue mass?  With his gout symptoms, could be a retained kidney stone with decreased urine force.  Will obtain ultrasound, pending results will consider starting flomax and referring to urology.    Plan: US Penile Soft Tissue            (M10.072) Acute idiopathic gout involving toe of left foot  Comment: continues to have symptoms, noted to have tophi.  Will increase allopurinol today.  Plan: allopurinol (ZYLOPRIM) 300 MG tablet            (I10) Benign essential hypertension  Comment:  Stable, tolerating med, no changes at this time.    Plan:     (R80.9) Microalbuminuria  Comment:  Stable, tolerating med, no changes at this time.  Continue lisinopril  Plan:     Patient has been advised of split billing requirements and indicates understanding: Yes      COUNSELING:   Reviewed preventive health counseling, as reflected in patient instructions        He reports that he has never smoked. He has never used smokeless tobacco.            OBI Amin [facility]

## 2023-11-22 ENCOUNTER — TELEPHONE (OUTPATIENT)
Dept: FAMILY MEDICINE | Facility: CLINIC | Age: 53
End: 2023-11-22
Payer: COMMERCIAL

## 2023-11-22 DIAGNOSIS — E11.319 TYPE 2 DIABETES MELLITUS WITH RETINOPATHY, WITHOUT LONG-TERM CURRENT USE OF INSULIN, MACULAR EDEMA PRESENCE UNSPECIFIED, UNSPECIFIED LATERALITY, UNSPECIFIED RETINOPATHY SEVERITY (H): Primary | ICD-10-CM

## 2023-11-24 ENCOUNTER — TELEPHONE (OUTPATIENT)
Dept: FAMILY MEDICINE | Facility: CLINIC | Age: 53
End: 2023-11-24
Payer: COMMERCIAL

## 2023-11-24 NOTE — TELEPHONE ENCOUNTER
PA Ozempic denied since he has not tried 2 approved drugs first [see letter below].  Please advise.    Radha Boone, RN, BSN, PHN  [facility]  [phone]        PRIOR AUTHORIZATION DENIED     Medication: SEMAGLUTIDE(0.25 OR 0.5MG/DOS) 2 MG/3ML SC SOPN  Insurance Company: [employer] [place] - Phone [phone] Fax [phone]  Denial Date: 11/22/2023  Denial Rational:   .      Appeal Information:     Patient Notified: No

## 2023-11-24 NOTE — TELEPHONE ENCOUNTER
MTM referral from: [facility] visit (referral by provider)    MTM referral outreach attempt #2 on November 24, 2023 at 11:36 AM      Outcome: Patient not reachable after several attempts, will route to MTM Pharmacist/Provider as an FYI.  [facility] scheduling number is .  Thank you for the referral.    Use blue plus ma for the carrier/Plan on the flowsheet      [employer] Message Sent    Che Berrios CPhT  [place]

## 2023-11-24 NOTE — TELEPHONE ENCOUNTER
PRIOR AUTHORIZATION DENIED    Medication: SEMAGLUTIDE(0.25 OR 0.5MG/DOS) 2 MG/3ML SC SOPN  Insurance Company: [employer] [place] - Phone [phone] Fax [phone]  Denial Date: 11/22/2023  Denial Rational:   .      Appeal Information:     Patient Notified: No

## 2023-11-27 ENCOUNTER — TELEPHONE (OUTPATIENT)
Dept: FAMILY MEDICINE | Facility: CLINIC | Age: 53
End: 2023-11-27
Payer: COMMERCIAL

## 2023-11-27 DIAGNOSIS — E11.319 DIABETIC RETINOPATHY OF BOTH EYES WITHOUT MACULAR EDEMA ASSOCIATED WITH TYPE 2 DIABETES MELLITUS, UNSPECIFIED RETINOPATHY SEVERITY (H): Primary | ICD-10-CM

## 2023-11-27 NOTE — TELEPHONE ENCOUNTER
The patient has already tried and failed both metformin and glipazide and continues to have elevated A1C.  He also has retinopathy and at his young age it is important we have aggressive management.  I would advise patient either follow up with MTM to assist with medications or we provide letter of necessity.

## 2023-11-27 NOTE — TELEPHONE ENCOUNTER
Plan does not cover metFORMIN (FORTAMET) 1000 MG 24 hr tablet.  Please start a new PA.    Reason for denial: Plan does not cover this med.    Insurance plan:    Patient ID: [id number]

## 2023-11-29 NOTE — TELEPHONE ENCOUNTER
PA Initiation    Medication: FORTAMET 1000 MG PO TB24  Insurance Company: [employer] - Phone [phone] Fax [phone]  Pharmacy Filling the Rx: [id number] [place] [id number] AT [facility] & [address] [place] [address]  Filling Pharmacy Phone: [phone]  Filling Pharmacy Fax:    Start Date: 11/29/2023  Central Prior Authorization Team   Phone: [phone]

## 2023-11-29 NOTE — TELEPHONE ENCOUNTER
Kamille Godoy --  Your last note stated:   The patient has already tried and failed both metformin and glipazide and continues to have elevated A1C.  He also has retinopathy and at his young age it is important we have aggressive management.  I would advise patient either follow up with MTM to assist with medications or we provide letter of necessity.       PA Ozempic denied since he has not tried 2 approved drugs first [see letter below].  Please advise.        Question: Would you want to consider prescribing one of these preferred drugs:   Preferred drugs are:   - Dolly Whelan (pens)  - Janumet  - Januvia  - Jentadueto  - Kombiglyze  - Onglyza  - Tradjenta    Kera Ayala, CHETANN RN  [facility]

## 2023-11-30 RX ORDER — LIRAGLUTIDE 6 MG/ML
INJECTION SUBCUTANEOUS
Qty: 35.4 ML | Refills: 0 | Status: SHIPPED | OUTPATIENT
Start: 2023-11-30 | End: 2024-01-02

## 2023-11-30 RX ORDER — METFORMIN HCL 500 MG
2000 TABLET, EXTENDED RELEASE 24 HR ORAL
Qty: 360 TABLET | Refills: 1 | Status: SHIPPED | OUTPATIENT
Start: 2023-11-30 | End: 2024-06-17

## 2023-11-30 NOTE — TELEPHONE ENCOUNTER
PRIOR AUTHORIZATION DENIED    Medication: FORTAMET 1000 MG PO TB24  Insurance Company: [employer] - Phone [phone] Fax [phone]  Denial Date: 11/29/2023  Denial Rational:       Appeal Information:     Patient Notified: Pharmacy will notify patient.

## 2023-11-30 NOTE — TELEPHONE ENCOUNTER
services: Mandarin: ID # [id number].    Writer called and left message on patient's voicemail to call back and speak with a triage nurse.    CHETAN RhodesN RN  [facility]

## 2023-12-01 NOTE — TELEPHONE ENCOUNTER
Kamille Godoy --    Please review and advise.   Patient does not want to do daily injections. Would like an oral medication chosen from the list of approved insurance medications.   Scheduled MTM visit. 1/2/2024.   Scheduled lab follow-up for glucose. 2/27/2024.       Writer spoke with patient using  services.    services: Mandarin: ID # [id number].     Message from Kamille Godoy --  Lets try victoza.  I sent in a 3 month ramp, patient should follow up with MTM     CHETAN RhodesN RN  [facility]

## 2023-12-01 NOTE — TELEPHONE ENCOUNTER
services: Mandarin: ID # [id number].    Writer relayed message from Kamille Godoy:   [facility] to discuss medication options.  Continue metformin and glipizide for now.     Patient states he has both glipizide and metformin to last until visit with MTM.     Kera Ayala, CHETANN SHANNON  [facility]

## 2023-12-12 ENCOUNTER — ANCILLARY PROCEDURE (OUTPATIENT)
Dept: ULTRASOUND IMAGING | Facility: CLINIC | Age: 53
End: 2023-12-12
Attending: NURSE PRACTITIONER
Payer: COMMERCIAL

## 2023-12-12 DIAGNOSIS — N48.89 PENILE MASS: ICD-10-CM

## 2023-12-12 PROCEDURE — 76857 US EXAM PELVIC LIMITED: CPT | Mod: GC | Performed by: STUDENT IN AN ORGANIZED HEALTH CARE EDUCATION/TRAINING PROGRAM

## 2023-12-31 NOTE — PROGRESS NOTES
Medication Therapy Management (MTM) Encounter    ASSESSMENT:                            Medication Adherence/Access: No issues identified    Diabetes:   Patient is not meeting A1c goal of < 7%.  Self monitoring of blood glucose is not at goal of fasting  mg/dL and post prandial < 150 mg/dL.    Microalbumin is not at goal <30mg/g. Is on low dose ace inhibitor.   Patient would benefit from:  Restart Metformin  combined with glipizide (see plan for details) , check blood sugars 2 x day .     Hyperlipidemia:   Stable.    Hypertension:   Stable.        PLAN:                            1. Please restart Metformin 500mg. ER  tablets -- 1 tab daily with dinner x 1 week--if tolerated --then increase to 2 tablets daily. Also I re-ordered your Glipizide 5mg. Tablets --stay with 1 tab daily with breakfast , check blood sugars before breakfast and also 2 hours after your biggest meal of the day --record those blood sugars and when I call you in 4 weeks we will decide if this plan is working.     Do your best with controlling carbohydrates in the diet , eat more protein(meats, fish , chicken etc.) and healthy fats (olives, salmon , nuts ,butter) if you can.         Follow-up: Return in about 4 weeks (around 1/30/2024), or @ 9;30 AM Via telephone call, for Medication Therapy Management Visit, Blood sugar meter review.    SUBJECTIVE/OBJECTIVE:                          Lisa Cazares is a 53 year old male called for an initial visit. He was referred to me from Kamille Gdooy  .  (ID# [id number]) was used during today's visit.  Professional Mandarin  Deja (ID# [id number]).      Requests mail AVS (cannot access [facility]).     Reason for visit:   Reason for Referral:  Diabetes management:  Type 2 diabetes mellitus with retinopathy, without long-term current use of insulin, macular edema presence unspecified, unspecified laterality, unspecified retinopathy severity (H)  Comment: Not controlled, A1C is 8.6.  Will change  his metformin to XR and will try to get ozempic approved, stop glucatrol for now, will titrate ozempic up.  Agreeable to follow up with MTM.  Recheck A1C in 3 months.      Patient states he declines injections for now --wants oral DM meds.     Allergies/ADRs: Reviewed in chart  Past Medical History: Reviewed in chart  Tobacco: He reports that he has never smoked. He has never used smokeless tobacco.  Alcohol: not currently using    Medication Adherence/Access: no issues reported    Diabetes :    Metformin ER 500mg tabs-- was on it before--2 tabs /daily  IR tabs before. Has not started these yet.     He is actually taking : glipizide 5mg. /day  --wants to finish last 10 days worth then start metformin.    Victoza daily injection--didn't pick it up yet.        No 2 hours after : has not been checking.           Not taking aspirin due to age  Patient is not experiencing side effects.  Blood sugar monitorin time(s) daily; Ranges: (patient reported) Fasting- Testing blood sugars:  Recently 160-180mg./dl-- checks daily - in am fasting .  Current diabetes symptoms: none  Diet/Exercise: n/a today .     Eye exam in the last 12 months? No    Foot exam is up to date  Urine Albumin:   Lab Results   Component Value Date    UMALCR 144.95 (H) 2023            Lab Results   Component Value Date    A1C 8.6 2023    A1C 7.2 2023    A1C 6.4 2023    A1C 6.3 2022    A1C 6.5 2021    A1C 7.3 2021    A1C 6.6 10/15/2020           Hypertension   Lisinopril 5mg./day   Patient reports no current medication side effects  Patient does not self-monitor blood pressure.       BP Readings from Last 3 Encounters:   23 134/78   23 120/76   23 (!) 145/66     Pulse Readings from Last 3 Encounters:   23 71   23 81   23 83     Hyperlipidemia     Atorvastatin 10mg daily  Patient reports no significant myalgias or other side effects.  The ASCVD Risk score (Jeimy LEZAMA, et al.,  2019) failed to calculate for the following reasons:    The valid total cholesterol range is 130 to 320 mg/dL       Recent Labs   Lab Test 02/28/23  1726 03/22/22  1134   CHOL 101 85   HDL 41 42   LDL 46 19   TRIG 68 122       ------------------------------------------------------------------------------------------------------------    I spent 40 minutes with this patient today. All changes were made via collaborative practice agreement with OBI Amin CNP. A copy of the visit note was provided to the patient's provider(s).    A summary of these recommendations was mailed to the patient.    [facility].  Medication Therapy Management Provider  [phone]      Telemedicine Visit Details  Type of service:  Telephone visit  Start Time: 9:30 AM  End Time:  10:10 AM     Medication Therapy Recommendations  Diabetic retinopathy of both eyes without macular edema associated with type 2 diabetes mellitus, unspecified retinopathy severity (H)    Current Medication: liraglutide (VICTOZA) 18 MG/3ML solution (Discontinued)   Rationale: More effective medication available - Ineffective medication - Effectiveness   Recommendation: Change Medication - metFORMIN 500 MG 24 hr tablet   Status: Accepted per [facility]          Rationale: Medication requires monitoring - Needs additional monitoring   Recommendation: Continue to Monitor - BLOOD GLUCOSE TEST STRIPS Strp - test 2 x day now as directed.   Status: Accepted per CPA

## 2024-01-02 ENCOUNTER — VIRTUAL VISIT (OUTPATIENT)
Dept: PHARMACY | Facility: CLINIC | Age: 54
End: 2024-01-02
Payer: COMMERCIAL

## 2024-01-02 DIAGNOSIS — E78.5 HYPERLIPIDEMIA LDL GOAL <100: ICD-10-CM

## 2024-01-02 DIAGNOSIS — E11.9 TYPE 2 DIABETES MELLITUS WITHOUT COMPLICATION, WITHOUT LONG-TERM CURRENT USE OF INSULIN (H): Primary | ICD-10-CM

## 2024-01-02 DIAGNOSIS — I10 ESSENTIAL HYPERTENSION: ICD-10-CM

## 2024-01-02 PROCEDURE — 99607 MTMS BY PHARM ADDL 15 MIN: CPT | Mod: 93 | Performed by: PHARMACIST

## 2024-01-02 PROCEDURE — 99605 MTMS BY PHARM NP 15 MIN: CPT | Mod: 93 | Performed by: PHARMACIST

## 2024-01-02 RX ORDER — GLIPIZIDE 5 MG/1
5 TABLET ORAL
Qty: 60 TABLET | Refills: 3 | Status: SHIPPED | OUTPATIENT
Start: 2024-01-02

## 2024-01-02 RX ORDER — GLIPIZIDE 5 MG/1
5 TABLET ORAL
COMMUNITY
End: 2024-01-02 | Stop reason: DRUGHIGH

## 2024-01-02 NOTE — Clinical Note
Kamille--thx for mtm referral --he declined dm injectables --wnet back to met+ glip combo --will f/up with me in 4 weeks to assess bs control--radha-saeed

## 2024-01-02 NOTE — PATIENT INSTRUCTIONS
"Recommendations from today's MTM visit:                                                    MTM (medication therapy management) is a service provided by a clinical pharmacist designed to help you get the most of out of your medicines.   Today we reviewed what your medicines are for, how to know if they are working, that your medicines are safe and how to make your medicine regimen as easy as possible.      1. Please restart Metformin 500mg. ER  tablets -- 1 tab daily with dinner x 1 week--if tolerated --then increase to 2 tablets daily. Also I re-ordered your Glipizide 5mg. Tablets --stay with 1 tab daily with breakfast , check blood sugars before breakfast and also 2 hours after your biggest meal of the day --record those blood sugars and when I call you in 4 weeks we will decide if this plan is working.     Do your best with controlling carbohydrates in the diet , eat more protein(meats, fish , chicken etc.) and healthy fats (olives, salmon , nuts ,butter) if you can.         Follow-up: Return in about 4 weeks (around 1/30/2024), or @ 9;30 AM Via telephone call, for Medication Therapy Management Visit, Blood sugar meter review.    It was great speaking with you today.  I value your experience and would be very thankful for your time in providing feedback in our clinic survey. In the next few days, you may receive an email or text message from [employer] with a link to a survey related to your  clinical pharmacist.\"     To schedule another MTM appointment, please call the clinic directly or you may call the MTM scheduling line at [phone] or toll-free at [phone].     My Clinical Pharmacist's contact information:                                                      Please feel free to contact me with any questions or concerns you have.      Marisela Dyer Rph.  Medication Therapy Management Provider  [phone]    "

## 2024-02-25 ENCOUNTER — HEALTH MAINTENANCE LETTER (OUTPATIENT)
Age: 54
End: 2024-02-25

## 2024-03-05 ENCOUNTER — LAB (OUTPATIENT)
Dept: LAB | Facility: CLINIC | Age: 54
End: 2024-03-05
Payer: COMMERCIAL

## 2024-03-05 DIAGNOSIS — E11.319 TYPE 2 DIABETES MELLITUS WITH RETINOPATHY, WITHOUT LONG-TERM CURRENT USE OF INSULIN, MACULAR EDEMA PRESENCE UNSPECIFIED, UNSPECIFIED LATERALITY, UNSPECIFIED RETINOPATHY SEVERITY (H): ICD-10-CM

## 2024-03-05 LAB — HBA1C MFR BLD: 7.4 % (ref 0–5.6)

## 2024-03-05 PROCEDURE — 36415 COLL VENOUS BLD VENIPUNCTURE: CPT

## 2024-03-05 PROCEDURE — 83036 HEMOGLOBIN GLYCOSYLATED A1C: CPT

## 2024-04-09 ENCOUNTER — OFFICE VISIT (OUTPATIENT)
Dept: FAMILY MEDICINE | Facility: CLINIC | Age: 54
End: 2024-04-09
Payer: COMMERCIAL

## 2024-04-09 VITALS
TEMPERATURE: 97.4 F | HEIGHT: 68 IN | OXYGEN SATURATION: 100 % | HEART RATE: 75 BPM | BODY MASS INDEX: 27.58 KG/M2 | WEIGHT: 182 LBS | SYSTOLIC BLOOD PRESSURE: 137 MMHG | DIASTOLIC BLOOD PRESSURE: 68 MMHG

## 2024-04-09 DIAGNOSIS — Z23 NEED FOR HEPATITIS B VACCINATION: ICD-10-CM

## 2024-04-09 DIAGNOSIS — E11.319 DIABETIC RETINOPATHY OF BOTH EYES WITHOUT MACULAR EDEMA ASSOCIATED WITH TYPE 2 DIABETES MELLITUS, UNSPECIFIED RETINOPATHY SEVERITY (H): Primary | ICD-10-CM

## 2024-04-09 DIAGNOSIS — Z23 NEED FOR SHINGLES VACCINE: ICD-10-CM

## 2024-04-09 DIAGNOSIS — R30.0 DYSURIA: ICD-10-CM

## 2024-04-09 LAB
ALBUMIN UR-MCNC: 30 MG/DL
APPEARANCE UR: CLEAR
BACTERIA #/AREA URNS HPF: ABNORMAL /HPF
BILIRUB UR QL STRIP: NEGATIVE
CHOLEST SERPL-MCNC: 115 MG/DL
COLOR UR AUTO: YELLOW
FASTING STATUS PATIENT QL REPORTED: NORMAL
GLUCOSE UR STRIP-MCNC: 100 MG/DL
HDLC SERPL-MCNC: 40 MG/DL
HGB UR QL STRIP: ABNORMAL
KETONES UR STRIP-MCNC: NEGATIVE MG/DL
LDLC SERPL CALC-MCNC: 51 MG/DL
LEUKOCYTE ESTERASE UR QL STRIP: NEGATIVE
MUCOUS THREADS #/AREA URNS LPF: PRESENT /LPF
NITRATE UR QL: NEGATIVE
NONHDLC SERPL-MCNC: 75 MG/DL
PH UR STRIP: 6.5 [PH] (ref 5–7)
PSA SERPL DL<=0.01 NG/ML-MCNC: 0.66 NG/ML (ref 0–3.5)
RBC #/AREA URNS AUTO: ABNORMAL /HPF
SP GR UR STRIP: 1.02 (ref 1–1.03)
SQUAMOUS #/AREA URNS AUTO: ABNORMAL /LPF
TRIGL SERPL-MCNC: 120 MG/DL
UROBILINOGEN UR STRIP-ACNC: 1 E.U./DL
WBC #/AREA URNS AUTO: ABNORMAL /HPF

## 2024-04-09 PROCEDURE — 99214 OFFICE O/P EST MOD 30 MIN: CPT | Mod: 25 | Performed by: NURSE PRACTITIONER

## 2024-04-09 PROCEDURE — 90471 IMMUNIZATION ADMIN: CPT | Performed by: NURSE PRACTITIONER

## 2024-04-09 PROCEDURE — 90750 HZV VACC RECOMBINANT IM: CPT | Performed by: NURSE PRACTITIONER

## 2024-04-09 PROCEDURE — 36415 COLL VENOUS BLD VENIPUNCTURE: CPT | Performed by: NURSE PRACTITIONER

## 2024-04-09 PROCEDURE — 81001 URINALYSIS AUTO W/SCOPE: CPT | Performed by: NURSE PRACTITIONER

## 2024-04-09 PROCEDURE — 80061 LIPID PANEL: CPT | Performed by: NURSE PRACTITIONER

## 2024-04-09 PROCEDURE — 90472 IMMUNIZATION ADMIN EACH ADD: CPT | Performed by: NURSE PRACTITIONER

## 2024-04-09 PROCEDURE — 90746 HEPB VACCINE 3 DOSE ADULT IM: CPT | Performed by: NURSE PRACTITIONER

## 2024-04-09 PROCEDURE — G0103 PSA SCREENING: HCPCS | Performed by: NURSE PRACTITIONER

## 2024-04-09 NOTE — PROGRESS NOTES
"  Assessment & Plan     Diabetic retinopathy of both eyes without macular edema associated with type 2 diabetes mellitus, unspecified retinopathy severity (H)  Working with MTM, improvement in last A1C.  Not quite at goal of <7%.  Strongly encouraged annual eye exam with history or retinopathy, referral placed today.    - Lipid panel reflex to direct LDL Non-fasting; Future  - Adult Eye  Referral; Future  - Lipid panel reflex to direct LDL Non-fasting    Dysuria  Endorses mild burning and he continues to be concerned about penile swelling which I am not noting on his exam.  We also obtained ultrasound which was normal.  Reviewed basic anatomy with him and discussed that increased glucose can lead to more frequent urination.  Will recheck UA today and PSA for screening purposes.  Reassurance provided.    - UA Macroscopic with reflex to Microscopic and Culture - Lab Collect; Future  - PSA, screen; Future  - UA Macroscopic with reflex to Microscopic and Culture - Lab Collect  - PSA, screen  - UA Microscopic with Reflex to Culture    Need for shingles vaccine  Given    Need for hepatitis B vaccination  Second vaccine given.    Diagnosis or treatment significantly limited by social determinants of health - language barrier            BMI  Estimated body mass index is 27.67 kg/m  as calculated from the following:    Height as of this encounter: 1.727 m (5' 8\").    Weight as of this encounter: 82.6 kg (182 lb).             Subjective   Lisa gabriel is a 54 year old, presenting for the following health issues:  Follow Up        4/9/2024     9:32 AM   Additional Questions   Roomed by Yaritza     History of Present Illness       Reason for visit:  Medicine     Following up on diabetes, doing well with medications.  Likes following with MTM.  Has not had recent eye exam.  Continues to be concerned that he could have penile swelling.  Also some mild dysuria.           Objective    /68   Pulse 75   Temp 97.4  F (36.3 " " C) (Oral)   Ht 1.727 m (5' 8\")   Wt 82.6 kg (182 lb)   SpO2 100%   BMI 27.67 kg/m    Body mass index is 27.67 kg/m .  Physical Exam   GENERAL: alert and no distress   (male): normal male genitalia without lesions or urethral discharge, no hernia            Signed Electronically by: OBI Amin CNP    "

## 2024-04-09 NOTE — NURSING NOTE
Prior to immunization administration, verified patients identity using patient s name and date of birth. Please see Immunization Activity for additional information.     Screening Questionnaire for Adult Immunization    Are you sick today?   No   Do you have allergies to medications, food, a vaccine component or latex?   No   Have you ever had a serious reaction after receiving a vaccination?   No   Do you have a long-term health problem with heart, lung, kidney, or metabolic disease (e.g., diabetes), asthma, a blood disorder, no spleen, complement component deficiency, a cochlear implant, or a spinal fluid leak?  Are you on long-term aspirin therapy?   Diabetes    Do you have cancer, leukemia, HIV/AIDS, or any other immune system problem?   No   Do you have a parent, brother, or sister with an immune system problem?   No   In the past 3 months, have you taken medications that affect  your immune system, such as prednisone, other steroids, or anticancer drugs; drugs for the treatment of rheumatoid arthritis, Crohn s disease, or psoriasis; or have you had radiation treatments?   No   Have you had a seizure, or a brain or other nervous system problem?   No   During the past year, have you received a transfusion of blood or blood    products, or been given immune (gamma) globulin or antiviral drug?   No   For women: Are you pregnant or is there a chance you could become       pregnant during the next month?   N/A   Have you received any vaccinations in the past 4 weeks?   No     Immunization questionnaire was positive for at least one answer.      Patient instructed to remain in clinic for 15 minutes afterwards, and to report any adverse reactions.     Screening performed by Yaritza MANUEL MA on 4/9/2024 at 11:10 AM.

## 2024-04-23 DIAGNOSIS — E11.9 TYPE 2 DIABETES MELLITUS WITHOUT COMPLICATION, WITHOUT LONG-TERM CURRENT USE OF INSULIN (H): ICD-10-CM

## 2024-04-23 RX ORDER — ATORVASTATIN CALCIUM 10 MG/1
10 TABLET, FILM COATED ORAL DAILY
Qty: 90 TABLET | Refills: 2 | Status: SHIPPED | OUTPATIENT
Start: 2024-04-23

## 2024-06-03 DIAGNOSIS — I10 BENIGN ESSENTIAL HYPERTENSION: ICD-10-CM

## 2024-06-03 DIAGNOSIS — E11.9 TYPE 2 DIABETES MELLITUS WITHOUT COMPLICATION, WITHOUT LONG-TERM CURRENT USE OF INSULIN (H): ICD-10-CM

## 2024-06-03 DIAGNOSIS — M10.072 ACUTE IDIOPATHIC GOUT INVOLVING TOE OF LEFT FOOT: ICD-10-CM

## 2024-06-03 DIAGNOSIS — R80.9 MICROALBUMINURIA: ICD-10-CM

## 2024-06-03 RX ORDER — LISINOPRIL 5 MG/1
5 TABLET ORAL DAILY
Qty: 90 TABLET | Refills: 0 | Status: SHIPPED | OUTPATIENT
Start: 2024-06-03 | End: 2024-09-06

## 2024-06-03 RX ORDER — ALLOPURINOL 300 MG/1
300 TABLET ORAL DAILY
Qty: 90 TABLET | Refills: 1 | Status: SHIPPED | OUTPATIENT
Start: 2024-06-03

## 2024-06-16 DIAGNOSIS — E11.319 DIABETIC RETINOPATHY OF BOTH EYES WITHOUT MACULAR EDEMA ASSOCIATED WITH TYPE 2 DIABETES MELLITUS, UNSPECIFIED RETINOPATHY SEVERITY (H): ICD-10-CM

## 2024-06-17 RX ORDER — METFORMIN HCL 500 MG
2000 TABLET, EXTENDED RELEASE 24 HR ORAL
Qty: 360 TABLET | Refills: 0 | Status: SHIPPED | OUTPATIENT
Start: 2024-06-17

## 2024-06-18 ENCOUNTER — OFFICE VISIT (OUTPATIENT)
Dept: FAMILY MEDICINE | Facility: CLINIC | Age: 54
End: 2024-06-18
Payer: COMMERCIAL

## 2024-06-18 VITALS
DIASTOLIC BLOOD PRESSURE: 76 MMHG | OXYGEN SATURATION: 98 % | SYSTOLIC BLOOD PRESSURE: 120 MMHG | WEIGHT: 179.7 LBS | RESPIRATION RATE: 16 BRPM | TEMPERATURE: 98.1 F | BODY MASS INDEX: 27.23 KG/M2 | HEART RATE: 72 BPM | HEIGHT: 68 IN

## 2024-06-18 DIAGNOSIS — Z12.11 SCREEN FOR COLON CANCER: ICD-10-CM

## 2024-06-18 DIAGNOSIS — K52.9 CHRONIC DIARRHEA: Primary | ICD-10-CM

## 2024-06-18 PROCEDURE — 99213 OFFICE O/P EST LOW 20 MIN: CPT | Performed by: PHYSICIAN ASSISTANT

## 2024-06-18 ASSESSMENT — ENCOUNTER SYMPTOMS: DIARRHEA: 1

## 2024-06-18 ASSESSMENT — PAIN SCALES - GENERAL: PAINLEVEL: NO PAIN (0)

## 2024-06-18 NOTE — PROGRESS NOTES
"  Assessment & Plan     (K52.9) Chronic diarrhea  (primary encounter diagnosis)  Comment:   Plan: psyllium (METAMUCIL/KONSYL) capsule        Patient previously had stool testing, negative culture negative C. difficile and negative O&P.  Based on chronic nature of diarrhea advised increasing fiber, supplementation sent in today.  Repeat colonoscopy for colon cancer screening based on increased risk in first-degree relative.  If workup is negative and symptoms not improving with fiber consider changing metformin or reducing dose.  Follow-up based on symptoms and pending testing    (Z12.11) Screen for colon cancer  Comment:   Plan: Colonoscopy Screening  Referral            BMI  Estimated body mass index is 27.32 kg/m  as calculated from the following:    Height as of this encounter: 1.727 m (5' 8\").    Weight as of this encounter: 81.5 kg (179 lb 11.2 oz).         See Patient Instructions    Subjective   Lisa gabriel is a 54 year old, presenting for the following health issues:  Diarrhea (Loose stool/Dad and cousin has colon cancer. /Doctor's note for work today. )      6/18/2024    10:44 AM   Additional Questions   Roomed by Mariah Browne    History of Present Illness       Reason for visit:  Diarrhea          Diarrhea  Onset/Duration: \"years\"  Description:       Consistency of stool: runny and loose       Blood in stool: No       Number of loose stools past 24 hours: 1  Progression of Symptoms: stable  Accompanying signs and symptoms:       Fever: No       Nausea/Vomiting: No       Abdominal pain: No       Weight loss: No       Episodes of constipation: No  History   Ill contacts: No  Recent use of antibiotics: No  Recent travels: No  Recent medication-new or changes(Rx or OTC): No  Precipitating or alleviating factors: None  Therapies tried and outcome: Imodium AD and PeptoBismol            Objective    /76   Pulse 72   Temp 98.1  F (36.7  C) (Temporal)   Resp 16   Ht 1.727 m (5' 8\")   Wt " 81.5 kg (179 lb 11.2 oz)   SpO2 98%   BMI 27.32 kg/m    Body mass index is 27.32 kg/m .  Physical Exam   GENERAL: healthy, alert and no distress  EYES: Eyes grossly normal to inspection, EOM intact and conjunctivae normal  RESP: breathing comfortably on room air  PSYCH: mentation appears normal, affect normal/bright              Signed Electronically by: Viktor Haas PA-C

## 2024-06-19 ENCOUNTER — TELEPHONE (OUTPATIENT)
Dept: GASTROENTEROLOGY | Facility: CLINIC | Age: 54
End: 2024-06-19
Payer: COMMERCIAL

## 2024-06-19 DIAGNOSIS — Z12.11 SPECIAL SCREENING FOR MALIGNANT NEOPLASMS, COLON: Primary | ICD-10-CM

## 2024-06-19 NOTE — LETTER
2024      Lisa Cazares  [address]  [facility] [address]              Standard Golytely (Colyte, Nulytely) Bowel Prep   Prep instructions for your colonoscopy   For prep questions, please call: [facility] - [address] [place] [address] - [phone] option 4  Prep sent to 2024 to [id number] - [place], [place] - [address] AT [address] on [address].    Please read these instructions carefully at least 7 days prior to your colonoscopy procedure. Be sure to follow all directions completely. The inside of your colon must be clean to allow for a complete examination for the presence of any growths, polyps, and/or abnormalities, as well as their biopsy or removal. A number of tips are included in order to make this part of the procedure as comfortable as possible.    Getting ready    prescription at the pharmacy. Endoscopy team will order this about 2 weeks before to your scheduled procedure. Included in the kit will be the followin Dulcolax (Bisacodyl) 5mg tablets  1 container of Polyethylene Glycol (Golytely, Colyte, Nulytely, Gavilyte-G)    A nurse will call you to go over your appointment details and prep instructions. Not completing the nurse call could result with your appointment being cancelled.    You must arrange for an adult to drive you home after your exam. Your colonoscopy cannot be done unless you have a ride. If you need to use public transportation, someone must ride with you and stay with you for up to 24 hours.       7 days before procedure   Consult with your prescribing provider about stopping any:     Diabetic/Weight Loss Injectable Medication GLP-1 agonist (such as Exenatide (Byetta, Bydureon),  Mounjaro (Tirzepatide).  Ozempic (Semaglutide). Semaglutide. Symlin (Pamlintide), Tanzeum (Albiglutide). Tirzepatide-Weight Management (Zepbound), Trulicity (Dulaglutide), Victoza (Saxenda, Liraglutide), Wegovy  (Semaglutide) please follow below guidelines for holding:    For weekly injection HOLD 7 days before procedure.  For once or twice a day injection HOLD the day before procedure and day of procedure.  For oral, daily dosing (Rybelsus) HOLD 7 days before procedure.    Blood thinning and/or anti platelet medications: such as Coumadin, Plavix, Xarelto, Eliquis, Lovenox or others, these medications may need to be stopped temporarily before your procedure.     If you take insulin for diabetes, ask your prescribing provider for instructions on how to manage this medication while preparing for a colonoscopy.     NSAIDs medications such as Sulindac, Celebrex, Mobic, Relafen or others may need to be stopped before the procedure. This will be discussed during nurse review call or you can reach out to your prescribing provider.     Stop taking iron (ferrous sulfate), multivitamins that contain iron, and/or fiber supplements (Metamucil, Benefiber, Psyllium husk powder, Fibercon, Bran, etc.).      Stop eating whole kernel corn, popcorn, nuts, and foods that contain seeds. These can stay in the colon for many days and they can clog up the colonoscope.       3 days before procedure     Begin a low-fiber diet (see examples below). No Olestra (a fat substitute).    Consume no more than 10-15 grams of fiber each day.     It is important to stay hydrated. Drink at least eight 8-ounce glasses of water a day.      LOW FIBER DIET   You can have:   Do not have:    Starches: White bread, rolls, biscuits, croissants, [place] toast, white flour tortillas, waffles, pancakes, [place] toast; white rice, noodles, pasta, macaroni; cooked and peeled potatoes; plain crackers, saltines; cooked farina or cream of rice; puffed rice, corn flakes, Rice Krispies, Special K      Vegetables: tender cooked and canned, vegetable broths     Fruits and fruit juices: Strained fruit juice, canned fruit without seeds or skin (not pineapple), applesauce, pear sauce,  ripe bananas, melons (not watermelon)     Milk products: Milk (plain or flavored), cheese, cottage cheese, yogurt (no berries), custard, ice cream       Proteins: Tender, well-cooked ground beef, lamb, veal, ham, pork, chicken, turkey, fish or organ meat, Tofu, eggs, creamy peanut butter      Fats and condiments:  Margarine, butter, oils, mayonnaise, sour cream, salad dressing, plain gravy; spices, cooked herbs; sugar, clear jelly, honey, syrup      Snacks, sweets and drinks: Pretzels, hard candy; plain cakes and cookies (no nuts or seeds); gelatin, plain pudding, sherbet, Popsicles; coffee, tea, carbonated ( fizzy ) drinks    Starches: Breads or rolls that contain nuts, seeds or fruit; whole wheat or whole grain breads that contain more than 2 grams of fiber per serving; cornbread; corn or whole wheat tortillas; potatoes with skin; brown rice, wild rice, quinoa, kasha (buckwheat), and oatmeal      Vegetables: Any raw or steamed vegetables; vegetables with seeds; corn in any form      Fruits and fruit juices: Prunes, prune juice, raisins and other dried fruits, berries and other fruits with seeds, canned pineapple juices with pulp such as orange, grapefruit, pineapple or tomato juice     Milk products: Any yogurt with nuts, seeds or berries      Proteins: Tough, fibrous meats with gristle; cooked dried beans, peas or lentils; crunchy peanut butter     Fats and condiments: Pickles, olives, relish, horseradish; jam, marmalade, preserves      Snacks, sweets and drinks: Popcorn, nuts, seeds, granola, coconut, candies made with nuts or seeds; all desserts that contain nuts, seeds, raisins and other dried fruits, coconut, whole grains or bran.                                               1 day before procedure     You can have a light, low-fiber breakfast. But drink only clear liquids after 9 a.m. (see list below).    Drink at least eight to ten 8-ounce glasses of water throughout the day. ? ? ? ? ? ? ? ?    Fill the  container of Golytely with a full gallon of warm water. Cover and shake until well mixed. Chill for 3 hours in refrigerator until it is time to drink solution.    Stop taking NSAIDs pain relievers, such as Advil, Ibuprofen, Motrin, etc. You may take Tylenol.    CLEAR LIQUID DIET:  You can have: Do not have:    Water, tea, coffee (no milk or cream)   Soda pop, Gatorade (not red or purple)   Coconut water   Jell-O, Popsicles (no milk or fruit pieces - not red or purple)   Fat-free soup broth or bouillon   Plain hard candy, such as clear life savers (not red or purple)   Clear juices and fruit-flavored drinks, such as apple juice, white grape juice, Hi-C, and Luis-Aid (not red or purple)  Milk or milk products such as ice cream, malts or shakes, or coffee creamer   Red or purple drinks of any kind such as cranberry juice, grape juice or Luis-Aid. Avoid red or purple Jell-O, Popsicles, sorbet, sherbet and candy   Juices with pulp such as orange, grapefruit, pineapple or tomato juice   Cream soups of any kind   Alcohol and beer   Protein drinks or protein powder     Step 1     At 3 PM, take 2 Dulcolax (Bisacodyl) tablets.   At 6 PM, start drinking one 8-ounce glass of Golytely mixture every 15 minutes until the container is HALF empty. Drink each glass quickly. Store the rest in the refrigerator.   Continue to drink clear liquids    Step 2     If you arrive for your procedure BEFORE 11 AM:  At 11 PM, take 2 Dulcolax (Bisacodyl) tablets  At 11 PM, start drinking the other half of the Golytely container. Drink one 8-ounce glass every 15 minutes until the container is empty. Drink each glass quickly.    If you arrive for your procedure AFTER 11 AM:  At 6 AM, take 2 Dulcolax (Bisacodyl) tablets  At 6 AM, start drinking the other half of the Golytely container. Drink one 8-ounce glass of Golytely mixture every 15 minutes until the container is empty. Drink each glass quickly.       Reminders While Drinking Laxatives:      After you start drinking the solution, stay near a toilet. You may have watery stools (diarrhea), mild cramping, bloating, and nausea. You may want to use Vaseline on the skin around your anus after each bowel movement or use wet wipes to prevent irritation. Bowel movements will be liquid and dark in color at first and then should turn clear yellow in color. Drinking the prepared solution may make you cold, wearing warm clothing can be helpful.    Some find it helpful to:  For added flavor, include a crystal light lemonade packet in each glass of Golytely, rather than mixing it into the entire prepared mixture.   In between each glass, try sucking on a lemon or a piece of hard candy to help diminish the flavor of the preparation.  Drinking from a straw can help minimize the taste of the prepared mixture.    If you have nausea or vomiting during drinking the solution, rinse your mouth with water and take a 15-30 minute break and then continue drinking solution.     Day of procedure     2 hours before your arrival time stop drinking all liquids, including water.   Do not smoke or swallow anything, including water or gum for at least 2 hours before your arrival time. This is a safety issue. Your procedure could be cancelled if you do not follow directions.  No chewing tobacco 6 hours prior to procedure arrival time.     You may take your necessary morning medications with sips of water (4 ounces).   Do not take diabetes medicine by mouth until after your exam.  If you have asthma, bring your inhalers.  Please perform your nebulizer treatments and airway clearance therapy in the morning prior to the procedure (if applicable).    Arrive with a responsible adult who can drive you home and stay with you for up to 24 hours. The medications used during the procedure will make you sleepy, so you won't be able to drive yourself home.   You cannot use public transportation, ride-share services, or non-medical taxi services  without a responsible caregiver. Medical transport services are okay, but a caregiver must be there to receive you at your destination.  Please check in with your  when you arrive. Drivers should stay on campus.    Expect to be at the procedure center for about 1.5-2.5 hours.    Do not wear jewelry (i.e. earrings, rings, necklaces, watches, etc.). Leave your purse, billfold, credit cards, and other valuables at home.      Bring insurance card and ID.       Answers to Commonly Asked Questions     How soon can I eat after the procedure?  You may resume your normal diet when you feel ready, unless advised otherwise by the doctor performing your procedure. We recommend starting with a light meal.   Do not drink alcohol for 24 hours after your procedure.  You may resume normal activities (work, exercise, etc.) after 24 hours.    How will I feel after the procedure?  It is normal to feel bloated and gassy after your procedure. Walking will help move the air through your colon. You can take non-aspirin pain relievers that contain acetaminophen (Tylenol).  If you are having sedation, we require a responsible adult to take you home for your safety. The sedation medicines used to relax you during the procedure can impair your judgement and reaction time, and make you forgetful and possibly a little unsteady.  Do not drive, make any important decisions, or sign any legal documents for 24 hours after your procedure.    When will I get my test results?  You should have your procedure results and any lab results (if applicable) by letter, MyChart message, or phone call within 2 weeks. If you have any questions, please call the doctor that referred you for the procedure.    How do I know if my colon is cleaned out?   After completing the bowel prep, your bowel movements should be all liquid and yellow. Your bowel movements will look similar to urine in the toilet. If there are pieces of stool (poop) in the toilet, or if you  can't see to the bottom of the toilet, please call our office for advice. Call [phone] and ask to speak with a nurse.    Why is the Golytely bowel prep taken in several steps?   The stool is flushed out by a large wave of fluid going through the colon. Just sipping a large volume of the solution will not achieve the desired result. Studies have shown that two smaller waves (or more in some cases) are better than one large one.      What if I need to cancel or reschedule my procedure?  Contact our endoscopy scheduling team at [phone], option 2. Monday through Friday, 7:00am-5:00pm.

## 2024-06-19 NOTE — TELEPHONE ENCOUNTER
"Endoscopy Scheduling Screen    Have you had a positive Covid test in the last 14 days?  No    What is your communication preference for Instructions and/or Bowel Prep?   MyChart    What insurance is in the chart?  Other:  [employer]    Ordering/Referring Provider:     [facility] SNYDER      (If ordering provider performs procedure, schedule with ordering provider unless otherwise instructed. )    BMI: Estimated body mass index is 27.32 kg/m  as calculated from the following:    Height as of 6/18/24: 1.727 m (5' 8\").    Weight as of 6/18/24: 81.5 kg (179 lb 11.2 oz).     Sedation Ordered  moderate sedation.   If patient BMI > 50 do not schedule in [facility].    If patient BMI > 45 do not schedule at [facility].    Are you taking methadone or Suboxone?  No    Have you had difficulties, pain, or discomfort during past endoscopy procedures?  No    Are you taking any prescription medications for pain 3 or more times per week?   NO, No RN review required.    Do you have a history of malignant hyperthermia?  No    (Females) Are you currently pregnant?   No     Have you been diagnosed or told you have pulmonary hypertension?   No    Do you have an LVAD?  No    Have you been told you have moderate to severe sleep apnea?  No    Have you been told you have COPD, asthma, or any other lung disease?  No    Do you have any heart conditions?  No     Have you ever had or are you waiting for an organ transplant?  No. Continue scheduling, no site restrictions.    Have you had a stroke or transient ischemic attack (TIA aka \"mini stroke\" in the last 6 months?   No    Have you been diagnosed with or been told you have cirrhosis of the liver?   No    Are you currently on dialysis?   No    Do you need assistance transferring?   No    BMI: Estimated body mass index is 27.32 kg/m  as calculated from the following:    Height as of 6/18/24: 1.727 m (5' 8\").    Weight as of 6/18/24: 81.5 kg (179 lb 11.2 oz).     Is patients BMI > 40 and scheduling " location [facility]?  No    Do you take an injectable medication for weight loss or diabetes (excluding insulin)?  No    Do you take the medication Naltrexone?  No    Do you take blood thinners?  No       Prep   Are you currently on dialysis or do you have chronic kidney disease?  No    Do you have a diagnosis of diabetes?  Yes (Golytely Prep)    Do you have a diagnosis of cystic fibrosis (CF)?  No    On a regular basis do you go 3 -5 days between bowel movements?  No    BMI > 40?  No    Preferred Pharmacy:    [facility][id number] [address] AT [address] [address]  Phone: [phone] Fax: [phone]      Final Scheduling Details     Procedure scheduled  Colonoscopy    Surgeon:  Keely     Date of procedure:  9/10     Pre-OP / PAC:   No - Not required for this site.    Location   patient as soonest opening for them and willing to go to     Sedation   Moderate Sedation - Per order.      Patient Reminders:   You will receive a call from a Nurse to review instructions and health history.  This assessment must be completed prior to your procedure.  Failure to complete the Nurse assessment may result in the procedure being cancelled.      On the day of your procedure, please designate an adult(s) who can drive you home stay with you for the next 24 hours. The medicines used in the exam will make you sleepy. You will not be able to drive.      You cannot take public transportation, ride share services, or non-medical taxi service without a responsible caregiver.  Medical transport services are allowed with the requirement that a responsible caregiver will receive you at your destination.  We require that drivers and caregivers are confirmed prior to your procedure.

## 2024-07-14 ENCOUNTER — HEALTH MAINTENANCE LETTER (OUTPATIENT)
Age: 54
End: 2024-07-14

## 2024-08-19 ENCOUNTER — NURSE TRIAGE (OUTPATIENT)
Dept: NURSING | Facility: CLINIC | Age: 54
End: 2024-08-19
Payer: COMMERCIAL

## 2024-08-19 RX ORDER — BISACODYL 5 MG/1
TABLET, DELAYED RELEASE ORAL
Qty: 4 TABLET | Refills: 0 | Status: SHIPPED | OUTPATIENT
Start: 2024-08-19

## 2024-08-19 NOTE — TELEPHONE ENCOUNTER
"Nurse Triage SBAR    Is this a 2nd Level Triage? YES, LICENSED PRACTITIONER REVIEW IS REQUIRED    Situation/Background:   Incoming call from patient's friend Andriy. Although CTC is on file she was wanting to interpret for patient. Respectfully asked that I call an  and she was agreeable.     Called and  and was able to triage the patient.     Abdominal pain in the lower right quadrant   Rates it 5-6/10 for pain  Always a dull pain   Walking or exercising makes it worse   Has history of appendicitis   These pain feels the same per patient   Denies pain radiating anywhere   Has been taking tylenol and amoxicillin that he states he got form [facility]   Has been taking the amoxicillin since the pain started about 6 weeks ago   He also started to mention another abx that writer did not get the name of  Advised patient to please not take any more abx that have not been prescribed and managed by a doctor  Denies nausea or vomiting   Denies swollen abdomen   Denies blood in stool   Last BM was today   Denies any problems with urination   Denies fever   Patient is at work until 9 pm today  Denies leaving to go to ED or UC  Is worried about missing work to be seen   Advised patient that he can request a medical excuse note once he is seen    He is agreeable to go to UC tomorrow morning when they open at 10 am   Advised that if anything gets worse or changes before then he would be advised to go to the ED   Patient verbalized understanding and agrees with plan. States that \"I have been managing it this long\"    Protocol Recommended Disposition:   Call [facility]/Go to ED/UCC Now (Or To Office with PCP Approval)    Recommendation: Patient only agreeable to UC tomorrow AM. Please advise.     Reason for Disposition   MILD TO MODERATE constant pain lasting > 2 hours    Additional Information   Negative: Passed out (i.e., fainted, collapsed and was not responding)   Negative: Shock suspected (e.g., " cold/pale/clammy skin, too weak to stand, low BP, rapid pulse)   Negative: Sounds like a life-threatening emergency to the triager   Negative: Followed an abdomen (stomach) injury   Negative: Chest pain   Negative: Pain is mainly in upper abdomen (if needed ask: 'is it mainly above the belly button?')   Negative: Abdomen bloating or swelling are main symptoms   Negative: SEVERE abdominal pain (e.g., excruciating)   Negative: Vomiting red blood or black (coffee ground) material   Negative: Blood in bowel movements  (Exception: Blood on surface of BM with constipation.)   Negative: Black or tarry bowel movements  (Exception: Chronic-unchanged black-grey BMs AND is taking iron pills or Pepto-Bismol.)   Negative: Unable to urinate (or only a few drops) and bladder feels very full   Negative: Pain in scrotum persists > 1 hour    Protocols used: Abdominal Pain - Male-A-[place]    Brianna Thomas RN  [facility]

## 2024-08-19 NOTE — TELEPHONE ENCOUNTER
Information  only.  Red flag triage.Spoke w pt through . Pt calls w  abdominal pain rt side.7-8/10 constant x 6 week. Can hardly stand up straight. Has not talked w his Took over the counter meds not helping.. Feel nauseous,, no vomiting. Not feverish.Advised ER. Refused.Can't afford it. Wants appt w his DR Robby Hernandez. Advised to call his clinic. Gave #.    LIAM Acuña tele triage Abdominal  pain pg 11

## 2024-08-19 NOTE — TELEPHONE ENCOUNTER
No further action. Patient already aware of UC plan.     Brianna Thomas RN  [facility]

## 2024-08-19 NOTE — TELEPHONE ENCOUNTER
Standard Golytely Bowel Prep recommended due to diabetes.  Instructions were sent via letter and [employer]. Bowel prep was sent 8/19/2024 to [id number] - [place] [id number] AT [address].

## 2024-08-20 ENCOUNTER — OFFICE VISIT (OUTPATIENT)
Dept: PEDIATRICS | Facility: CLINIC | Age: 54
End: 2024-08-20
Payer: COMMERCIAL

## 2024-08-20 ENCOUNTER — OFFICE VISIT (OUTPATIENT)
Dept: URGENT CARE | Facility: URGENT CARE | Age: 54
End: 2024-08-20
Payer: COMMERCIAL

## 2024-08-20 ENCOUNTER — ANCILLARY PROCEDURE (OUTPATIENT)
Dept: CT IMAGING | Facility: CLINIC | Age: 54
End: 2024-08-20
Attending: PHYSICIAN ASSISTANT
Payer: COMMERCIAL

## 2024-08-20 VITALS
HEART RATE: 74 BPM | DIASTOLIC BLOOD PRESSURE: 71 MMHG | TEMPERATURE: 97.6 F | OXYGEN SATURATION: 98 % | HEIGHT: 70 IN | BODY MASS INDEX: 25.77 KG/M2 | WEIGHT: 180 LBS | RESPIRATION RATE: 20 BRPM | SYSTOLIC BLOOD PRESSURE: 113 MMHG

## 2024-08-20 VITALS
TEMPERATURE: 98.4 F | WEIGHT: 180 LBS | HEART RATE: 77 BPM | SYSTOLIC BLOOD PRESSURE: 116 MMHG | OXYGEN SATURATION: 97 % | DIASTOLIC BLOOD PRESSURE: 77 MMHG | HEIGHT: 69 IN | BODY MASS INDEX: 26.66 KG/M2

## 2024-08-20 DIAGNOSIS — R16.1 SPLENOMEGALY: ICD-10-CM

## 2024-08-20 DIAGNOSIS — R10.31 RLQ ABDOMINAL PAIN: Primary | ICD-10-CM

## 2024-08-20 DIAGNOSIS — Z98.890 HX OF ABDOMINAL SURGERY: ICD-10-CM

## 2024-08-20 DIAGNOSIS — R10.31 RLQ ABDOMINAL PAIN: ICD-10-CM

## 2024-08-20 LAB
ALBUMIN SERPL BCG-MCNC: 4.7 G/DL (ref 3.5–5.2)
ALBUMIN UR-MCNC: 100 MG/DL
ALP SERPL-CCNC: 87 U/L (ref 40–150)
ALT SERPL W P-5'-P-CCNC: 27 U/L (ref 0–70)
ANION GAP SERPL CALCULATED.3IONS-SCNC: 8 MMOL/L (ref 7–15)
APPEARANCE UR: CLEAR
AST SERPL W P-5'-P-CCNC: 31 U/L (ref 0–45)
BACTERIA #/AREA URNS HPF: ABNORMAL /HPF
BASOPHILS # BLD AUTO: 0 10E3/UL (ref 0–0.2)
BASOPHILS NFR BLD AUTO: 1 %
BILIRUB SERPL-MCNC: 0.8 MG/DL
BILIRUB UR QL STRIP: NEGATIVE
BUN SERPL-MCNC: 14.4 MG/DL (ref 6–20)
CALCIUM SERPL-MCNC: 9.9 MG/DL (ref 8.8–10.4)
CHLORIDE SERPL-SCNC: 101 MMOL/L (ref 98–107)
COLOR UR AUTO: YELLOW
CREAT BLD-MCNC: 1 MG/DL (ref 0.7–1.3)
CREAT SERPL-MCNC: 0.87 MG/DL (ref 0.67–1.17)
EGFRCR SERPLBLD CKD-EPI 2021: >60 ML/MIN/1.73M2
EGFRCR SERPLBLD CKD-EPI 2021: >90 ML/MIN/1.73M2
EOSINOPHIL # BLD AUTO: 0.1 10E3/UL (ref 0–0.7)
EOSINOPHIL NFR BLD AUTO: 1 %
ERYTHROCYTE [DISTWIDTH] IN BLOOD BY AUTOMATED COUNT: 12.2 % (ref 10–15)
GLUCOSE SERPL-MCNC: 169 MG/DL (ref 70–99)
GLUCOSE UR STRIP-MCNC: 500 MG/DL
HCO3 SERPL-SCNC: 29 MMOL/L (ref 22–29)
HCT VFR BLD AUTO: 44.1 % (ref 40–53)
HGB BLD-MCNC: 15 G/DL (ref 13.3–17.7)
HGB UR QL STRIP: ABNORMAL
IMM GRANULOCYTES # BLD: 0 10E3/UL
IMM GRANULOCYTES NFR BLD: 0 %
KETONES UR STRIP-MCNC: NEGATIVE MG/DL
LEUKOCYTE ESTERASE UR QL STRIP: NEGATIVE
LYMPHOCYTES # BLD AUTO: 0.9 10E3/UL (ref 0.8–5.3)
LYMPHOCYTES NFR BLD AUTO: 21 %
MCH RBC QN AUTO: 33.3 PG (ref 26.5–33)
MCHC RBC AUTO-ENTMCNC: 34 G/DL (ref 31.5–36.5)
MCV RBC AUTO: 98 FL (ref 78–100)
MONOCYTES # BLD AUTO: 0.3 10E3/UL (ref 0–1.3)
MONOCYTES NFR BLD AUTO: 6 %
NEUTROPHILS # BLD AUTO: 3.1 10E3/UL (ref 1.6–8.3)
NEUTROPHILS NFR BLD AUTO: 70 %
NITRATE UR QL: NEGATIVE
PH UR STRIP: 6 [PH] (ref 5–7)
PLATELET # BLD AUTO: 146 10E3/UL (ref 150–450)
POTASSIUM SERPL-SCNC: 4.4 MMOL/L (ref 3.4–5.3)
PROT SERPL-MCNC: 7.9 G/DL (ref 6.4–8.3)
RBC # BLD AUTO: 4.51 10E6/UL (ref 4.4–5.9)
RBC #/AREA URNS AUTO: ABNORMAL /HPF
SODIUM SERPL-SCNC: 138 MMOL/L (ref 135–145)
SP GR UR STRIP: 1.02 (ref 1–1.03)
SQUAMOUS #/AREA URNS AUTO: ABNORMAL /LPF
UROBILINOGEN UR STRIP-ACNC: 0.2 E.U./DL
WBC # BLD AUTO: 4.4 10E3/UL (ref 4–11)
WBC #/AREA URNS AUTO: ABNORMAL /HPF

## 2024-08-20 PROCEDURE — 250N000009 HC RX 250: Performed by: PHYSICIAN ASSISTANT

## 2024-08-20 PROCEDURE — 36415 COLL VENOUS BLD VENIPUNCTURE: CPT | Performed by: PHYSICIAN ASSISTANT

## 2024-08-20 PROCEDURE — 82565 ASSAY OF CREATININE: CPT

## 2024-08-20 PROCEDURE — 250N000011 HC RX IP 250 OP 636: Performed by: PHYSICIAN ASSISTANT

## 2024-08-20 PROCEDURE — 80053 COMPREHEN METABOLIC PANEL: CPT | Performed by: PHYSICIAN ASSISTANT

## 2024-08-20 PROCEDURE — 99215 OFFICE O/P EST HI 40 MIN: CPT | Performed by: PHYSICIAN ASSISTANT

## 2024-08-20 PROCEDURE — 99207 REFERRAL TO ACUTE AND DIAGNOSTIC SERVICES: CPT | Performed by: PHYSICIAN ASSISTANT

## 2024-08-20 PROCEDURE — 85025 COMPLETE CBC W/AUTO DIFF WBC: CPT | Performed by: PHYSICIAN ASSISTANT

## 2024-08-20 PROCEDURE — 81001 URINALYSIS AUTO W/SCOPE: CPT | Performed by: PHYSICIAN ASSISTANT

## 2024-08-20 PROCEDURE — 74177 CT ABD & PELVIS W/CONTRAST: CPT

## 2024-08-20 RX ORDER — IOPAMIDOL 755 MG/ML
89 INJECTION, SOLUTION INTRAVASCULAR ONCE
Status: COMPLETED | OUTPATIENT
Start: 2024-08-20 | End: 2024-08-20

## 2024-08-20 RX ORDER — DICYCLOMINE HCL 20 MG
20 TABLET ORAL 4 TIMES DAILY PRN
Qty: 40 TABLET | Refills: 0 | Status: SHIPPED | OUTPATIENT
Start: 2024-08-20

## 2024-08-20 RX ADMIN — IOPAMIDOL 89 ML: 755 INJECTION, SOLUTION INTRAVENOUS at 12:32

## 2024-08-20 RX ADMIN — SODIUM CHLORIDE 65 ML: 9 INJECTION, SOLUTION INTRAVENOUS at 12:32

## 2024-08-20 ASSESSMENT — PAIN SCALES - GENERAL: PAINLEVEL: SEVERE PAIN (6)

## 2024-08-20 NOTE — PROGRESS NOTES
Assessment/Plan:      See patient instructions below.    At the end of the encounter, I discussed results, diagnosis, medications. Discussed red flags for immediate return to clinic/ER, as well as indications for follow up if no improvement. Patient understood and agreed to plan. Patient was stable for discharge.    No diagnosis found.      No follow-ups on file.    DIEGO Catherine, JEISON  [facility]  -----------------------------------------------------------------------------------------------------------------------------------------------------    HPI:  Lisa Cazares is a 54 year old male who presents for evaluation of the following:    Abdominal/Flank Pain  Onset/Duration: 1.5 months  Description:   Character: Sharp, Dull ache, and Stabbing  Location: right lower quadrant  Radiation: None  Intensity: moderate  Progression of Symptoms:  same and constant  Accompanying Signs & Symptoms:  Fever/chills: no   Gas/Bloating: YES  Nausea: no   Vomitting: no   Diarrhea: YES- sometimes.  Constipation:no   Dysuria: no            Hematuria: no            Frequency: no            Incontinence of urine: no   History:            Last bowel movement: today  Trauma: YES- he had recent surgery.  He still has his appendix.  Previous similar pain: no    Previous tests done: CT 10 years ago in [place]                      Previous Abdominal surgery: YES- Appendix surgery.  Appendix left inside.  Precipitating factors:   Does the pain change with:     Food: no      Bowel Movement: no     Urination: no              Other factors: no   Therapies tried and outcome:  None     When food last eaten: Patient is fasting.    Past Medical History:   Diagnosis Date    Diabetes (H)        Vitals:    08/20/24 1115   BP: 113/71   BP Location: Left arm   Patient Position: Sitting   Cuff Size: Adult Large   Pulse: 74   Resp: 20   Temp: 97.6  F (36.4  C)   TempSrc: Temporal   SpO2: 98%   Weight: 81.6 kg (180 lb)  "  Height: 1.765 m (5' 9.5\")       Physical Exam    Labs/Imaging:  No results found for this or any previous visit (from the past 24 hour(s)).  No results found for this or any previous visit (from the past 24 hour(s)).    {Diagnostic Test Results (Optional):939782}    There are no Patient Instructions on file for this visit.        "

## 2024-08-20 NOTE — PROGRESS NOTES
"Assessment & Plan     RLQ abdominal pain    Patient has RLQ abdominal pain for 1 month  This does not appear like an appy but with prior surgery and being RLQ abdominal pain patient is sent to the [facility] for blood work and advanced imaging  - Referral to Acute and Diagnostic Services (Day of diagnostic / First order acute)    Hx of abdominal surgery    Hx of abdominal surgery in RLQ, prior tumor remocal  - Referral to Acute and Diagnostic Services (Day of diagnostic / First order acute)         Referral to [facility] for advanced imaging    No follow-ups on file.    Napoleon Ignacio, [facility], PA-C  [facility] [place]    Subjective     Lisa gabriel is a 54 year old male who presents to clinic today for the following health issues:  Chief Complaint   Patient presents with    Urgent Care     Pt in clinic to have eval for right side abdominal pain.    Abdominal Pain       HPI  Review of Systems  Constitutional, HEENT, cardiovascular, pulmonary, GI, , musculoskeletal, neuro, skin, endocrine and psych systems are negative, except as otherwise noted.      Objective    /77   Pulse 77   Temp 98.4  F (36.9  C) (Oral)   Ht 1.76 m (5' 9.29\")   Wt 81.6 kg (180 lb)   SpO2 97%   BMI 26.36 kg/m    Physical Exam   GENERAL: alert and no distress  EYES: Eyes grossly normal to inspection, PERRL and conjunctivae and sclerae normal  HENT: ear canals and TM's normal, nose and mouth without ulcers or lesions  NECK: no adenopathy, no asymmetry, masses, or scars  RESP: lungs clear to auscultation - no rales, rhonchi or wheezes  CV: regular rate and rhythm, normal S1 S2, no S3 or S4, no murmur, click or rub, no peripheral edema  ABDOMEN: tenderness RLQ  MS: no gross musculoskeletal defects noted, no edema  SKIN: no suspicious lesions or rashes  NEURO: Normal strength and tone, mentation intact and speech normal  PSYCH: mentation appears normal, affect normal/bright          "

## 2024-08-20 NOTE — PROGRESS NOTES
Assessment/Plan:    Labs unremarkable. Proteinuria noted but patient's kidney function is normal. Glycosuria also noted, patient has DM and has no hyperglycemia today.   CT abd/pelvis shows minimal splenomegaly, and 2 nonobstructing stones in the right kidney. No acute findings or cause for pain identified. Splenomegaly can be monitored by PCP, no acute symptoms concerning this at this time.    Unclear etiology at this time. Will Rx Bentyl to try for pain.    Patient saw PCP in June regarding symptoms, and a colonoscopy was ordered. This is scheduled for 9/10/24. If normal & no improvement, could consider changing metformin or reducing dose. Advised he make appt with PCP for next month to discuss results of colonoscopy & further management of symptoms.     See patient instructions below.    At the end of the encounter, I discussed results, diagnosis, medications. Discussed red flags for immediate return to clinic/ER, as well as indications for follow up if no improvement. Patient understood and agreed to plan. Patient was stable for discharge.    42 minutes was spent preparing for the visit and reviewing the patient's chart; getting a history and performing an exam; counseling and providing education to the patient, family, or caregiver; ordering medicines and/or tests; communicating with other healthcare professionals; documenting information in the medical record; interpreting results and sharing that information with the patient, family, or caregiver; and care coordination.       ICD-10-CM    1. RLQ abdominal pain  R10.31 sodium chloride (PF) 0.9% PF flush 3 mL     CT Abdomen Pelvis w Contrast     CBC with platelets differential     Comprehensive metabolic panel     UA with Microscopic reflex to Culture     CBC with platelets differential     Comprehensive metabolic panel     UA with Microscopic reflex to Culture     UA Microscopic with Reflex to Culture     dicyclomine (BENTYL) 20 MG tablet      2.  "Splenomegaly  R16.1             Return in about 2 weeks (around 9/3/2024) for Follow up w/ primary care provider if not better.    DIEGO Catherine, JEISON  [facility]  -----------------------------------------------------------------------------------------------------------------------------------------------------    HPI:  Lisa Cazares is a 54 year old male with history of type 2 DM who presents for evaluation of the following:    Abdominal/Flank Pain  Onset/Duration: 1.5 months  Description:   Character: Sharp, Dull ache, and Stabbing  Location: right lower quadrant  Radiation: None  Intensity: moderate  Progression of Symptoms:  same and constant  Accompanying Signs & Symptoms:  Fever/chills: no   Gas/Bloating: YES  Nausea: no   Vomitting: no   Diarrhea: YES- once every few weeks, this is not new for him  Constipation:no   Dysuria: no            Hematuria: no            Frequency: no            Incontinence of urine: no   History:            Last bowel movement: today  Trauma: no  Previous similar pain: YES- gets this pain intermittently last 10 years   Previous tests done: CT 10 years ago in [place]                      Previous Abdominal surgery: YES- see details below  Precipitating factors:   Does the pain change with:     Food: no      Bowel Movement: no     Urination: no              Other factors: no   Therapies tried and outcome:  None       10 years ago in [place], patient was diagnosed with appendicitis. States he had surgery for this and a \"mass\" was found, which was biopsied and found to be benign. He states his appendix was left in him.   Since then, he has had intermittent pain in his RLQ which will last for 2-3 days. When he gets this pain, he self treats with antibiotics and it then resolves. He was treating with amoxicillin and cephalosporins, which he was able to get OTC in [place].   Pain has never lasted this long, but he has also not treated with antibiotics this " "time.        Past Medical History:   Diagnosis Date    Diabetes (H)        Vitals:    08/20/24 1115   BP: 113/71   BP Location: Left arm   Patient Position: Sitting   Cuff Size: Adult Large   Pulse: 74   Resp: 20   Temp: 97.6  F (36.4  C)   TempSrc: Temporal   SpO2: 98%   Weight: 81.6 kg (180 lb)   Height: 1.765 m (5' 9.5\")       Physical Exam  Vitals and nursing note reviewed.   Pulmonary:      Effort: Pulmonary effort is normal.   Abdominal:      General: Bowel sounds are normal.      Palpations: Abdomen is soft.      Tenderness: There is abdominal tenderness in the right lower quadrant. There is no right CVA tenderness, left CVA tenderness, guarding or rebound.   Skin:     Findings: No rash.   Neurological:      Mental Status: He is alert.         Labs/Imaging:  Results for orders placed or performed in visit on 08/20/24 (from the past 24 hour(s))   CBC with platelets differential    Narrative    The following orders were created for panel order CBC with platelets differential.  Procedure                               Abnormality         Status                     ---------                               -----------         ------                     CBC with platelets and d...[[id number]]  Abnormal            Final result                 Please view results for these tests on the individual orders.   CBC with platelets and differential   Result Value Ref Range    WBC Count 4.4 4.0 - 11.0 10e3/uL    RBC Count 4.51 4.40 - 5.90 10e6/uL    Hemoglobin 15.0 13.3 - 17.7 g/dL    Hematocrit 44.1 40.0 - 53.0 %    MCV 98 78 - 100 fL    MCH 33.3 (H) 26.5 - 33.0 pg    MCHC 34.0 31.5 - 36.5 g/dL    RDW 12.2 10.0 - 15.0 %    Platelet Count 146 (L) 150 - 450 10e3/uL    % Neutrophils 70 %    % Lymphocytes 21 %    % Monocytes 6 %    % Eosinophils 1 %    % Basophils 1 %    % Immature Granulocytes 0 %    Absolute Neutrophils 3.1 1.6 - 8.3 10e3/uL    Absolute Lymphocytes 0.9 0.8 - 5.3 10e3/uL    Absolute Monocytes 0.3 0.0 - 1.3 " 10e3/uL    Absolute Eosinophils 0.1 0.0 - 0.7 10e3/uL    Absolute Basophils 0.0 0.0 - 0.2 10e3/uL    Absolute Immature Granulocytes 0.0 <=0.4 10e3/uL   UA with Microscopic reflex to Culture    Specimen: Urine, Clean Catch   Result Value Ref Range    Color Urine Yellow Colorless, Straw, Light Yellow, Yellow    Appearance Urine Clear Clear    Glucose Urine 500 (A) Negative mg/dL    Bilirubin Urine Negative Negative    Ketones Urine Negative Negative mg/dL    Specific Gravity Urine 1.025 1.003 - 1.035    Blood Urine Trace (A) Negative    pH Urine 6.0 5.0 - 7.0    Protein Albumin Urine 100 (A) Negative mg/dL    Urobilinogen Urine 0.2 0.2, 1.0 E.U./dL    Nitrite Urine Negative Negative    Leukocyte Esterase Urine Negative Negative   UA Microscopic with Reflex to Culture   Result Value Ref Range    Bacteria Urine None Seen None Seen /HPF    RBC Urine 0-2 0-2 /HPF /HPF    WBC Urine 0-5 0-5 /HPF /HPF    Squamous Epithelials Urine Few (A) None Seen /LPF    Narrative    Urine Culture not indicated   Comprehensive metabolic panel   Result Value Ref Range    Sodium 138 135 - 145 mmol/L    Potassium 4.4 3.4 - 5.3 mmol/L    Carbon Dioxide (CO2) 29 22 - 29 mmol/L    Anion Gap 8 7 - 15 mmol/L    Urea Nitrogen 14.4 6.0 - 20.0 mg/dL    Creatinine 0.87 0.67 - 1.17 mg/dL    GFR Estimate >90 >60 mL/min/1.73m2    Calcium 9.9 8.8 - 10.4 mg/dL    Chloride 101 98 - 107 mmol/L    Glucose 169 (H) 70 - 99 mg/dL    Alkaline Phosphatase 87 40 - 150 U/L    AST 31 0 - 45 U/L    ALT 27 0 - 70 U/L    Protein Total 7.9 6.4 - 8.3 g/dL    Albumin 4.7 3.5 - 5.2 g/dL    Bilirubin Total 0.8 <=1.2 mg/dL     No results found for this or any previous visit (from the past 24 hour(s)).        There are no Patient Instructions on file for this visit.

## 2024-08-20 NOTE — PROGRESS NOTES
"Acute and Diagnostic Services Clinic Visit    {PROVIDER CHARTING PREFERENCE:740628}    Subjective   Lisa gabriel is a 54 year old, presenting for the following health issues:  Abdominal Pain (Patient is here for abdominal pain x 1.5 months.)  {(!) Visit Details have not yet been documented.  Please enter Visit Details and then use this list to pull in documentation. (Optional):976800}  HPI     Abdominal/Flank Pain  Onset/Duration: 1.5 months  Description:   Character: Sharp, Dull ache, and Stabbing  Location: right lower quadrant  Radiation: None  Intensity: moderate  Progression of Symptoms:  same and constant  Accompanying Signs & Symptoms:  Fever/chills: no   Gas/Bloating: YES  Nausea: no   Vomitting: no   Diarrhea: YES- sometimes.  Constipation:no   Dysuria: no            Hematuria: no            Frequency: no            Incontinence of urine: no   History:            Last bowel movement: today  Trauma: YES- he had recent surgery.  He still has his appendix.  Previous similar pain: no    Previous tests done: CT 10 years ago in [place]            Previous Abdominal surgery: YES- Appendix surgery.  Appendix left inside.  Precipitating factors:   Does the pain change with:     Food: no      Bowel Movement: no     Urination: no              Other factors: no   Therapies tried and outcome:  None    When food last eaten: Patient is fasting.      {ROS Picklists (Optional):142281}      Objective    /71 (BP Location: Left arm, Patient Position: Sitting, Cuff Size: Adult Large)   Pulse 74   Temp 97.6  F (36.4  C) (Temporal)   Resp 20   Ht 1.765 m (5' 9.5\")   Wt 81.6 kg (180 lb)   SpO2 98%   BMI 26.20 kg/m    Body mass index is 26.2 kg/m .  Physical Exam   {Exam List (Optional):039791}    {Diagnostic Test Results (Optional):495587}        Signed Electronically by: Jaky Carty PA-C  {Email feedback regarding this note to [email or web address]   :065536}  "

## 2024-09-03 ENCOUNTER — TELEPHONE (OUTPATIENT)
Dept: GASTROENTEROLOGY | Facility: CLINIC | Age: 54
End: 2024-09-03
Payer: COMMERCIAL

## 2024-09-03 NOTE — TELEPHONE ENCOUNTER
Pre visit planning completed.      Procedure details:    Patient scheduled for Colonoscopy on 9/10/24.     Arrival time: 1015. Procedure time 1100    Facility location: [facility]; [address] [place] [address] [place] [address]. Check in location: [facility].     Sedation type: Conscious sedation     Pre op exam needed? No.    Indication for procedure: screening      Chart review:     Electronic implanted devices? No    Recent diagnosis of diverticulitis within the last 6 weeks? No      Medication review:    Diabetic? Yes. Diabetic medication HOLDING recommendations: Oral diabetic medications: Glipizide (glucotrol): HOLD day of procedure.  Metformin (glucophage): HOLD day of procedure.     Anticoagulants? No    Weight loss medication/injectable? No.    NSAIDS? No    Other medication HOLDING recommendations:  Fiber supplement HOLD 7 days before procedure      Prep for procedure:     Bowel prep recommendation: Standard Golytely. Bowel prep prescription sent to [id number] - [place], [place] - [address] AT [address]  Due to: CKD noted.  and diabetes.     Prep instructions sent via [facility] 8/19/24         Izabella Sue RN  Endoscopy Procedure Pre Assessment RN  [phone] option 4

## 2024-09-04 NOTE — TELEPHONE ENCOUNTER
Pre assessment completed for upcoming procedure.   (Please see previous telephone encounter notes for complete details)    Procedure details:    Arrival time and facility location reviewed.    Pre op exam needed? No.    Designated  policy reviewed. Instructed to have someone stay 6  hours post procedure.       Medication review:    Medications reviewed. Please see supporting documentation below. Holding recommendations discussed (if applicable).   Oral diabetic medication(s): Glipizide (glucotrol): HOLD day of procedure.  Metformin (glucophage): HOLD day of procedure.      Prep for procedure:     Procedure prep instructions reviewed.  Patient stated they have already reviewed the bowel prep instructions and writer answered all patient questions (if applicable). NPO instructions reviewed.    Patient was instructed to call if any questions or concerns arise.        Any additional information needed:  N/A      Patient  verbalized understanding and had no questions or concerns at this time.      Izabella Sue RN  [phone] option 4

## 2024-09-06 DIAGNOSIS — I10 BENIGN ESSENTIAL HYPERTENSION: ICD-10-CM

## 2024-09-06 DIAGNOSIS — R80.9 MICROALBUMINURIA: ICD-10-CM

## 2024-09-06 DIAGNOSIS — E11.9 TYPE 2 DIABETES MELLITUS WITHOUT COMPLICATION, WITHOUT LONG-TERM CURRENT USE OF INSULIN (H): ICD-10-CM

## 2024-09-06 RX ORDER — LISINOPRIL 5 MG/1
5 TABLET ORAL DAILY
Qty: 90 TABLET | Refills: 0 | Status: SHIPPED | OUTPATIENT
Start: 2024-09-06

## 2024-09-10 ENCOUNTER — HOSPITAL ENCOUNTER (OUTPATIENT)
Facility: CLINIC | Age: 54
Discharge: HOME | End: 2024-09-10
Attending: COLON & RECTAL SURGERY | Admitting: COLON & RECTAL SURGERY
Payer: COMMERCIAL

## 2024-09-10 VITALS
HEART RATE: 75 BPM | SYSTOLIC BLOOD PRESSURE: 152 MMHG | OXYGEN SATURATION: 95 % | DIASTOLIC BLOOD PRESSURE: 88 MMHG | RESPIRATION RATE: 16 BRPM

## 2024-09-10 LAB — COLONOSCOPY: NORMAL

## 2024-09-10 PROCEDURE — 99153 MOD SED SAME PHYS/QHP EA: CPT | Performed by: COLON & RECTAL SURGERY

## 2024-09-10 PROCEDURE — G0105 COLORECTAL SCRN; HI RISK IND: HCPCS | Performed by: COLON & RECTAL SURGERY

## 2024-09-10 PROCEDURE — 45378 DIAGNOSTIC COLONOSCOPY: CPT | Performed by: COLON & RECTAL SURGERY

## 2024-09-10 PROCEDURE — 250N000011 HC RX IP 250 OP 636: Performed by: COLON & RECTAL SURGERY

## 2024-09-10 PROCEDURE — G0500 MOD SEDAT ENDO SERVICE >5YRS: HCPCS | Performed by: COLON & RECTAL SURGERY

## 2024-09-10 RX ORDER — ONDANSETRON 4 MG/1
4 TABLET, ORALLY DISINTEGRATING ORAL EVERY 6 HOURS PRN
Status: CANCELLED | OUTPATIENT
Start: 2024-09-10

## 2024-09-10 RX ORDER — NALOXONE HYDROCHLORIDE 0.4 MG/ML
0.4 INJECTION, SOLUTION INTRAMUSCULAR; INTRAVENOUS; SUBCUTANEOUS
Status: CANCELLED | OUTPATIENT
Start: 2024-09-10

## 2024-09-10 RX ORDER — ONDANSETRON 2 MG/ML
4 INJECTION INTRAMUSCULAR; INTRAVENOUS EVERY 6 HOURS PRN
Status: CANCELLED | OUTPATIENT
Start: 2024-09-10

## 2024-09-10 RX ORDER — ONDANSETRON 2 MG/ML
4 INJECTION INTRAMUSCULAR; INTRAVENOUS
Status: DISCONTINUED | OUTPATIENT
Start: 2024-09-10 | End: 2024-09-10 | Stop reason: HOSPADM

## 2024-09-10 RX ORDER — FLUMAZENIL 0.1 MG/ML
0.2 INJECTION, SOLUTION INTRAVENOUS
Status: CANCELLED | OUTPATIENT
Start: 2024-09-10 | End: 2024-09-10

## 2024-09-10 RX ORDER — NALOXONE HYDROCHLORIDE 0.4 MG/ML
0.2 INJECTION, SOLUTION INTRAMUSCULAR; INTRAVENOUS; SUBCUTANEOUS
Status: CANCELLED | OUTPATIENT
Start: 2024-09-10

## 2024-09-10 RX ORDER — CYANOCOBALAMIN (VITAMIN B-12) 500 MCG
400 LOZENGE ORAL DAILY
COMMUNITY

## 2024-09-10 RX ORDER — PROCHLORPERAZINE MALEATE 10 MG
10 TABLET ORAL EVERY 6 HOURS PRN
Status: CANCELLED | OUTPATIENT
Start: 2024-09-10

## 2024-09-10 RX ORDER — FENTANYL CITRATE 50 UG/ML
INJECTION, SOLUTION INTRAMUSCULAR; INTRAVENOUS PRN
Status: DISCONTINUED | OUTPATIENT
Start: 2024-09-10 | End: 2024-09-10 | Stop reason: HOSPADM

## 2024-09-10 RX ORDER — LIDOCAINE 40 MG/G
CREAM TOPICAL
Status: DISCONTINUED | OUTPATIENT
Start: 2024-09-10 | End: 2024-09-10 | Stop reason: HOSPADM

## 2024-09-10 ASSESSMENT — ACTIVITIES OF DAILY LIVING (ADL)
ADLS_ACUITY_SCORE: 35
ADLS_ACUITY_SCORE: 33
ADLS_ACUITY_SCORE: 35

## 2024-10-01 DIAGNOSIS — E11.9 TYPE 2 DIABETES MELLITUS WITHOUT COMPLICATION, WITHOUT LONG-TERM CURRENT USE OF INSULIN (H): ICD-10-CM

## 2024-10-03 RX ORDER — GLIPIZIDE 5 MG/1
5 TABLET ORAL
Qty: 60 TABLET | Refills: 3 | OUTPATIENT
Start: 2024-10-03

## 2024-10-04 ENCOUNTER — TELEPHONE (OUTPATIENT)
Dept: FAMILY MEDICINE | Facility: CLINIC | Age: 54
End: 2024-10-04
Payer: COMMERCIAL

## 2024-10-04 NOTE — TELEPHONE ENCOUNTER
Patient's friend called (patient is next to her) regarding glipiZIDE (GLUCOTROL) 5 MG tablet. They stated they cannot get a refill at the pharmacy and told to call the clinic. Per chart review, Kamille stated follow-up appt is needed. Patient is requesting a refill first before he schedules an appt. He will be out of Rx tomorrow. Writer informed them that RN cannot sent Rx, we will have to send a message to PCP. Offered appt but soonest appt is in NOV and patient refused. Patient is wondering if PCP can see him sooner or can she fill Rx for the gap?        Juan Ramon Zacarias Cem Say, BSN RN  [facility]

## 2024-10-04 NOTE — TELEPHONE ENCOUNTER
He needs to schedule and I will consider a bridge.  He can certainly consider scheduling with another provider with an earlier opening

## 2024-10-05 DIAGNOSIS — E11.9 TYPE 2 DIABETES MELLITUS WITHOUT COMPLICATION, WITHOUT LONG-TERM CURRENT USE OF INSULIN (H): ICD-10-CM

## 2024-10-05 RX ORDER — GLIPIZIDE 5 MG/1
5 TABLET ORAL
Qty: 60 TABLET | Refills: 3 | OUTPATIENT
Start: 2024-10-05

## 2024-10-07 NOTE — TELEPHONE ENCOUNTER
LVM on friends line to help patient set up visit prior to bridge being concerned, can schedule w/ another physician if desired-

## 2024-10-09 ENCOUNTER — VIRTUAL VISIT (OUTPATIENT)
Dept: FAMILY MEDICINE | Facility: CLINIC | Age: 54
End: 2024-10-09
Payer: COMMERCIAL

## 2024-10-09 DIAGNOSIS — E11.319 DIABETIC RETINOPATHY OF BOTH EYES WITHOUT MACULAR EDEMA ASSOCIATED WITH TYPE 2 DIABETES MELLITUS, UNSPECIFIED RETINOPATHY SEVERITY (H): Primary | ICD-10-CM

## 2024-10-09 DIAGNOSIS — I10 BENIGN ESSENTIAL HYPERTENSION: ICD-10-CM

## 2024-10-09 DIAGNOSIS — R80.9 MICROALBUMINURIA: ICD-10-CM

## 2024-10-09 DIAGNOSIS — M10.072 ACUTE IDIOPATHIC GOUT INVOLVING TOE OF LEFT FOOT: ICD-10-CM

## 2024-10-09 DIAGNOSIS — E11.69 TYPE 2 DIABETES MELLITUS WITH OTHER SPECIFIED COMPLICATION, WITHOUT LONG-TERM CURRENT USE OF INSULIN (H): ICD-10-CM

## 2024-10-09 PROCEDURE — 99443 PR PHYSICIAN TELEPHONE EVALUATION 21-30 MIN: CPT | Mod: 93 | Performed by: PHYSICIAN ASSISTANT

## 2024-10-09 RX ORDER — LISINOPRIL 10 MG/1
10 TABLET ORAL DAILY
Qty: 90 TABLET | Refills: 1 | Status: SHIPPED | OUTPATIENT
Start: 2024-10-09

## 2024-10-09 RX ORDER — INDOMETHACIN 50 MG/1
50 CAPSULE ORAL 2 TIMES DAILY WITH MEALS
Qty: 30 CAPSULE | Refills: 0 | Status: SHIPPED | OUTPATIENT
Start: 2024-10-09

## 2024-10-09 RX ORDER — METFORMIN HYDROCHLORIDE 500 MG/1
1500 TABLET, EXTENDED RELEASE ORAL
Qty: 270 TABLET | Refills: 1 | Status: SHIPPED | OUTPATIENT
Start: 2024-10-09

## 2024-10-09 RX ORDER — GLIPIZIDE 5 MG/1
5 TABLET ORAL DAILY
Qty: 90 TABLET | Refills: 1 | Status: SHIPPED | OUTPATIENT
Start: 2024-10-09

## 2024-10-09 RX ORDER — ATORVASTATIN CALCIUM 10 MG/1
10 TABLET, FILM COATED ORAL DAILY
Qty: 90 TABLET | Refills: 1 | Status: SHIPPED | OUTPATIENT
Start: 2024-10-09

## 2024-10-09 RX ORDER — ALLOPURINOL 300 MG/1
300 TABLET ORAL DAILY
Qty: 90 TABLET | Refills: 1 | Status: SHIPPED | OUTPATIENT
Start: 2024-10-09

## 2024-10-09 NOTE — PROGRESS NOTES
Lisa gabriel is a 54 year old who is being evaluated via a billable telephone visit.    What phone number would you like to be contacted at? [phone]   How would you like to obtain your AVS? oCri  Originating Location (pt. Location): Home    Distant Location (provider location):  On-site  Joined by : Jeannette ID [id number]    Assessment & Plan     Diabetic retinopathy of both eyes without macular edema associated with type 2 diabetes mellitus, unspecified retinopathy severity (H)  Due for eye exam, referral placed. He denies eye symptoms.  - Albumin Random Urine Quantitative with Creat Ratio; Future  - Hemoglobin A1c; Future  - metFORMIN (GLUCOPHAGE XR) 500 MG 24 hr tablet; Take 3 tablets (1,500 mg) by mouth daily (with dinner).  - Comprehensive metabolic panel (BMP + Alb, Alk Phos, ALT, AST, Total. Bili, TP); Future  - Adult Eye  Referral; Future    Type 2 diabetes mellitus with other specified complication, without long-term current use of insulin (H)  Per patient, blood sugars well controlled with different dosing than prescribed, updated prescriptions: glipizide 5 mg daily, metformin 1500 mg daily.  - glipiZIDE (GLUCOTROL) 5 MG tablet; Take 1 tablet (5 mg) by mouth daily.  - atorvastatin (LIPITOR) 10 MG tablet; Take 1 tablet (10 mg) by mouth daily.  - lisinopril (ZESTRIL) 10 MG tablet; Take 1 tablet (10 mg) by mouth daily.    Microalbuminuria  Due to recheck albumin.  - lisinopril (ZESTRIL) 10 MG tablet; Take 1 tablet (10 mg) by mouth daily.    Benign essential hypertension  Will increase lisinopril to 10 mg due to high blood pressure. Recommend blood pressure check and lab visit in 2-3 weeks.  - lisinopril (ZESTRIL) 10 MG tablet; Take 1 tablet (10 mg) by mouth daily.    Acute idiopathic gout involving toe of left foot  Has flare now. Will send in short course of indomethacin, he has been using tylenol. Due to check uric acid level and kidney function. Per uptodate no dosage changes based on  kidney function, will monitor closely and use sparingly. I am concerned with side effects and hyperglycemia with prednisone.  Estimated Creatinine Clearance: 95.2 mL/min (based on SCr of 1 mg/dL).   - Uric acid; Future  - allopurinol (ZYLOPRIM) 300 MG tablet; Take 1 tablet (300 mg) by mouth daily.  - indomethacin (INDOCIN) 50 MG capsule; Take 1 capsule (50 mg) by mouth 2 times daily (with meals). As needed for toe pain. Use 1-2 weeks.      Patient says his friend will read his AVS to him to reiterate plan:  Increase lisinopril from 5 mg to 10 mg daily  Monitor blood pressure - goal is less than 130/80    I refilled the metformin, glipizide, atorvastatin, allopurinol    Take indomethacin 50 mg two times daily as needed for toe pain/gout flare. Only use this when you have the pain - use for 1-2 weeks as needed    Check labs and blood pressure check in 2 weeks - call your clinic to schedule lab only visit and nurse only visit  Follow up with Kamille Godoy in 3 months in clinic for a recheck    Schedule appointment with eye exam      Subjective    Lisa gabriel is a 54 year old, presenting for the following health issues:  Recheck Medication        6/18/2024    10:44 AM   Additional Questions   Roomed by Mariah DIXON     Diabetes Follow-up    How often are you checking your blood sugar? Occasionally. Had been 120s when he was on medication  What concerns do you have today about your diabetes?  Other: ran out of medication  Have you had a diabetic eye exam in the last 12 months? No         Hyperlipidemia Follow-Up    Are you regularly taking any medication or supplement to lower your cholesterol?   Yes- lipitor    Hypertension Follow-up    Do you check your blood pressure regularly outside of the clinic? sometimes   Are your blood pressures ever more than 140 on the top number (systolic) OR more   than 90 on the bottom number (diastolic), for example 140/90? Yes - 150s-160/90    BP Readings from Last 2 Encounters:   09/10/24  (!) 152/88   08/20/24 113/71     Hemoglobin A1C (%)   Date Value   03/05/2024 7.4 (H)   11/21/2023 8.6 (H)   04/28/2021 6.5 (H)   01/12/2021 7.3 (H)     LDL Cholesterol Calculated (mg/dL)   Date Value   04/09/2024 51   02/28/2023 46   10/15/2020 29       He ran out of diabetic medications about a week ago, this is his biggest concern        Other than noted above, general, HEENT, respiratory, cardiac, MS, and gastrointestinal systems are negative.       Objective    Vitals - Patient Reported  Systolic (Patient Reported): (!) 155  Diastolic (Patient Reported): (!) 90      Vitals:  No vitals were obtained today due to virtual visit.    Physical Exam   General: Alert and no distress //Respiratory: No audible wheeze, cough, or shortness of breath // Psychiatric:  Appropriate affect, tone, and pace of words        Phone call duration: 34 minutes  Signed Electronically by: Veronica Bryant PA-C

## 2024-10-09 NOTE — PATIENT INSTRUCTIONS
Increase lisinopril from 5 mg to 10 mg daily  Monitor blood pressure - goal is less than 130/80    I refilled the metformin, glipizide, atorvastatin, allopurinol    Take indomethacin 50 mg two times daily as needed for toe pain/gout flare. Only use this when you have the pain - use for 1-2 weeks as needed    Check labs and blood pressure check in 2 weeks - call your clinic to schedule lab only visit and nurse only visit  Follow up with Kamille Godoy in 3 months in clinic for a recheck    Schedule appointment with eye exam

## 2024-10-22 ENCOUNTER — PATIENT OUTREACH (OUTPATIENT)
Dept: CARE COORDINATION | Facility: CLINIC | Age: 54
End: 2024-10-22
Payer: COMMERCIAL

## 2024-11-05 ENCOUNTER — PATIENT OUTREACH (OUTPATIENT)
Dept: CARE COORDINATION | Facility: CLINIC | Age: 54
End: 2024-11-05

## 2024-11-05 ENCOUNTER — OFFICE VISIT (OUTPATIENT)
Dept: FAMILY MEDICINE | Facility: CLINIC | Age: 54
End: 2024-11-05
Payer: COMMERCIAL

## 2024-11-05 VITALS
WEIGHT: 183 LBS | BODY MASS INDEX: 26.2 KG/M2 | HEART RATE: 69 BPM | HEIGHT: 70 IN | TEMPERATURE: 97.3 F | DIASTOLIC BLOOD PRESSURE: 76 MMHG | OXYGEN SATURATION: 98 % | RESPIRATION RATE: 16 BRPM | SYSTOLIC BLOOD PRESSURE: 136 MMHG

## 2024-11-05 DIAGNOSIS — R30.0 DYSURIA: ICD-10-CM

## 2024-11-05 DIAGNOSIS — Z00.00 ROUTINE GENERAL MEDICAL EXAMINATION AT A HEALTH CARE FACILITY: Primary | ICD-10-CM

## 2024-11-05 DIAGNOSIS — E11.319 DIABETIC RETINOPATHY OF BOTH EYES WITHOUT MACULAR EDEMA ASSOCIATED WITH TYPE 2 DIABETES MELLITUS, UNSPECIFIED RETINOPATHY SEVERITY (H): ICD-10-CM

## 2024-11-05 DIAGNOSIS — R16.1 SPLENOMEGALY: ICD-10-CM

## 2024-11-05 DIAGNOSIS — M10.072 ACUTE IDIOPATHIC GOUT INVOLVING TOE OF LEFT FOOT: ICD-10-CM

## 2024-11-05 DIAGNOSIS — I10 BENIGN ESSENTIAL HYPERTENSION: ICD-10-CM

## 2024-11-05 DIAGNOSIS — E11.21 DIABETIC NEPHROPATHY ASSOCIATED WITH TYPE 2 DIABETES MELLITUS (H): ICD-10-CM

## 2024-11-05 DIAGNOSIS — L03.032 PARONYCHIA OF GREAT TOE OF LEFT FOOT: ICD-10-CM

## 2024-11-05 DIAGNOSIS — R80.9 MICROALBUMINURIA: ICD-10-CM

## 2024-11-05 DIAGNOSIS — B35.1 FUNGAL INFECTION OF TOENAIL: ICD-10-CM

## 2024-11-05 LAB
ALBUMIN UR-MCNC: 30 MG/DL
APPEARANCE UR: CLEAR
BASOPHILS # BLD AUTO: 0 10E3/UL (ref 0–0.2)
BASOPHILS NFR BLD AUTO: 0 %
BILIRUB UR QL STRIP: NEGATIVE
COLOR UR AUTO: YELLOW
EOSINOPHIL # BLD AUTO: 0 10E3/UL (ref 0–0.7)
EOSINOPHIL NFR BLD AUTO: 0 %
ERYTHROCYTE [DISTWIDTH] IN BLOOD BY AUTOMATED COUNT: 12.7 % (ref 10–15)
EST. AVERAGE GLUCOSE BLD GHB EST-MCNC: 154 MG/DL
GLUCOSE UR STRIP-MCNC: NEGATIVE MG/DL
HBA1C MFR BLD: 7 % (ref 0–5.6)
HCT VFR BLD AUTO: 41.9 % (ref 40–53)
HGB BLD-MCNC: 14.3 G/DL (ref 13.3–17.7)
HGB UR QL STRIP: ABNORMAL
IMM GRANULOCYTES # BLD: 0 10E3/UL
IMM GRANULOCYTES NFR BLD: 0 %
KETONES UR STRIP-MCNC: NEGATIVE MG/DL
LEUKOCYTE ESTERASE UR QL STRIP: NEGATIVE
LYMPHOCYTES # BLD AUTO: 1.2 10E3/UL (ref 0.8–5.3)
LYMPHOCYTES NFR BLD AUTO: 31 %
MCH RBC QN AUTO: 33.3 PG (ref 26.5–33)
MCHC RBC AUTO-ENTMCNC: 34.1 G/DL (ref 31.5–36.5)
MCV RBC AUTO: 98 FL (ref 78–100)
MONOCYTES # BLD AUTO: 0.3 10E3/UL (ref 0–1.3)
MONOCYTES NFR BLD AUTO: 8 %
NEUTROPHILS # BLD AUTO: 2.4 10E3/UL (ref 1.6–8.3)
NEUTROPHILS NFR BLD AUTO: 60 %
NITRATE UR QL: NEGATIVE
PH UR STRIP: 6 [PH] (ref 5–7)
PLATELET # BLD AUTO: 145 10E3/UL (ref 150–450)
RBC # BLD AUTO: 4.29 10E6/UL (ref 4.4–5.9)
SP GR UR STRIP: 1.02 (ref 1–1.03)
UROBILINOGEN UR STRIP-ACNC: 0.2 E.U./DL
WBC # BLD AUTO: 3.9 10E3/UL (ref 4–11)

## 2024-11-05 PROCEDURE — 82043 UR ALBUMIN QUANTITATIVE: CPT | Performed by: PHYSICIAN ASSISTANT

## 2024-11-05 PROCEDURE — 99396 PREV VISIT EST AGE 40-64: CPT | Performed by: PHYSICIAN ASSISTANT

## 2024-11-05 PROCEDURE — 80053 COMPREHEN METABOLIC PANEL: CPT | Performed by: PHYSICIAN ASSISTANT

## 2024-11-05 PROCEDURE — 81003 URINALYSIS AUTO W/O SCOPE: CPT | Performed by: PHYSICIAN ASSISTANT

## 2024-11-05 PROCEDURE — 85025 COMPLETE CBC W/AUTO DIFF WBC: CPT | Performed by: PHYSICIAN ASSISTANT

## 2024-11-05 PROCEDURE — 83036 HEMOGLOBIN GLYCOSYLATED A1C: CPT | Performed by: PHYSICIAN ASSISTANT

## 2024-11-05 PROCEDURE — 84550 ASSAY OF BLOOD/URIC ACID: CPT | Performed by: PHYSICIAN ASSISTANT

## 2024-11-05 PROCEDURE — 99214 OFFICE O/P EST MOD 30 MIN: CPT | Mod: 25 | Performed by: PHYSICIAN ASSISTANT

## 2024-11-05 PROCEDURE — 82570 ASSAY OF URINE CREATININE: CPT | Performed by: PHYSICIAN ASSISTANT

## 2024-11-05 RX ORDER — CEPHALEXIN 500 MG/1
500 CAPSULE ORAL 2 TIMES DAILY
Qty: 14 CAPSULE | Refills: 0 | Status: SHIPPED | OUTPATIENT
Start: 2024-11-05 | End: 2024-11-12

## 2024-11-05 RX ORDER — CICLOPIROX 80 MG/ML
SOLUTION TOPICAL
Qty: 6.6 ML | Refills: 11 | Status: SHIPPED | OUTPATIENT
Start: 2024-11-05

## 2024-11-05 SDOH — HEALTH STABILITY: PHYSICAL HEALTH: ON AVERAGE, HOW MANY DAYS PER WEEK DO YOU ENGAGE IN MODERATE TO STRENUOUS EXERCISE (LIKE A BRISK WALK)?: 7 DAYS

## 2024-11-05 SDOH — HEALTH STABILITY: PHYSICAL HEALTH: ON AVERAGE, HOW MANY MINUTES DO YOU ENGAGE IN EXERCISE AT THIS LEVEL?: 30 MIN

## 2024-11-05 ASSESSMENT — SOCIAL DETERMINANTS OF HEALTH (SDOH): HOW OFTEN DO YOU GET TOGETHER WITH FRIENDS OR RELATIVES?: TWICE A WEEK

## 2024-11-05 ASSESSMENT — PAIN SCALES - GENERAL: PAINLEVEL_OUTOF10: MODERATE PAIN (4)

## 2024-11-05 NOTE — PATIENT INSTRUCTIONS
Patient Education   Preventive Care Advice   This is general advice given by our system to help you stay healthy. However, your care team may have specific advice just for you. Please talk to your care team about your preventive care needs.  Nutrition  Eat 5 or more servings of fruits and vegetables each day.  Try wheat bread, brown rice and whole grain pasta (instead of white bread, rice, and pasta).  Get enough calcium and vitamin D. Check the label on foods and aim for 100% of the RDA (recommended daily allowance).  Lifestyle  Exercise at least 150 minutes each week  (30 minutes a day, 5 days a week).  Do muscle strengthening activities 2 days a week. These help control your weight and prevent disease.  No smoking.  Wear sunscreen to prevent skin cancer.  Have a dental exam and cleaning every 6 months.  Yearly exams  See your health care team every year to talk about:  Any changes in your health.  Any medicines your care team has prescribed.  Preventive care, family planning, and ways to prevent chronic diseases.  Shots (vaccines)   HPV shots (up to age 26), if you've never had them before.  Hepatitis B shots (up to age 59), if you've never had them before.  COVID-19 shot: Get this shot when it's due.  Flu shot: Get a flu shot every year.  Tetanus shot: Get a tetanus shot every 10 years.  Pneumococcal, hepatitis A, and RSV shots: Ask your care team if you need these based on your risk.  Shingles shot (for age 50 and up)  General health tests  Diabetes screening:  Starting at age 35, Get screened for diabetes at least every 3 years.  If you are younger than age 35, ask your care team if you should be screened for diabetes.  Cholesterol test: At age 39, start having a cholesterol test every 5 years, or more often if advised.  Bone density scan (DEXA): At age 50, ask your care team if you should have this scan for osteoporosis (brittle bones).  Hepatitis C: Get tested at least once in your life.  STIs (sexually  transmitted infections)  Before age 24: Ask your care team if you should be screened for STIs.  After age 24: Get screened for STIs if you're at risk. You are at risk for STIs (including HIV) if:  You are sexually active with more than one person.  You don't use condoms every time.  You or a partner was diagnosed with a sexually transmitted infection.  If you are at risk for HIV, ask about PrEP medicine to prevent HIV.  Get tested for HIV at least once in your life, whether you are at risk for HIV or not.  Cancer screening tests  Cervical cancer screening: If you have a cervix, begin getting regular cervical cancer screening tests starting at age 21.  Breast cancer scan (mammogram): If you've ever had breasts, begin having regular mammograms starting at age 40. This is a scan to check for breast cancer.  Colon cancer screening: It is important to start screening for colon cancer at age 45.  Have a colonoscopy test every 10 years (or more often if you're at risk) Or, ask your provider about stool tests like a FIT test every year or Cologuard test every 3 years.  To learn more about your testing options, visit:   .  For help making a decision, visit:   https://bit.ly/mw66058.  Prostate cancer screening test: If you have a prostate, ask your care team if a prostate cancer screening test (PSA) at age 55 is right for you.  Lung cancer screening: If you are a current or former smoker ages 50 to 80, ask your care team if ongoing lung cancer screenings are right for you.  For informational purposes only. Not to replace the advice of your health care provider. Copyright   2023 [employer]. All rights reserved. Clinically reviewed by the [facility] Transitions Program. [employer] [id number] - REV 01/24.

## 2024-11-05 NOTE — LETTER
November 7, 2024      Lisa Cazares  [address]  [facility] [address]        Dear ,    We are writing to inform you of your test results.      Mateo Gonzalez,  The urine microalbumin test is elevated. This means there is some protein in the urine and your kidneys are being affected by the diabetes. Controlling the diabetes and blood pressure is the best treatment in order to keep this stable. You should avoid NSAIDs like ibuprofen as much as possible. Anybody that has this should be on lisinopril - as you already are - since this is protective for the kidneys.     A1C is better! 7.0 and goal is less than 7.0.     Other labs are normal/stable.  Sharona Bryant PA-C    Resulted Orders   Hemoglobin A1c   Result Value Ref Range    Estimated Average Glucose 154 (H) <117 mg/dL    Hemoglobin A1C 7.0 (H) 0.0 - 5.6 %      Comment:      Normal <5.7%   Prediabetes 5.7-6.4%    Diabetes 6.5% or higher     Note: Adopted from ADA consensus guidelines.   Urinalysis Macroscopic - lab collect   Result Value Ref Range    Color Urine Yellow Colorless, Straw, Light Yellow, Yellow    Appearance Urine Clear Clear    Glucose Urine Negative Negative mg/dL    Bilirubin Urine Negative Negative    Ketones Urine Negative Negative mg/dL    Specific Gravity Urine 1.020 1.003 - 1.035    Blood Urine Trace (A) Negative    pH Urine 6.0 5.0 - 7.0    Protein Albumin Urine 30 (A) Negative mg/dL    Urobilinogen Urine 0.2 0.2, 1.0 E.U./dL    Nitrite Urine Negative Negative    Leukocyte Esterase Urine Negative Negative   CBC with platelets and differential   Result Value Ref Range    WBC Count 3.9 (L) 4.0 - 11.0 10e3/uL    RBC Count 4.29 (L) 4.40 - 5.90 10e6/uL    Hemoglobin 14.3 13.3 - 17.7 g/dL    Hematocrit 41.9 40.0 - 53.0 %    MCV 98 78 - 100 fL    MCH 33.3 (H) 26.5 - 33.0 pg    MCHC 34.1 31.5 - 36.5 g/dL    RDW 12.7 10.0 - 15.0 %    Platelet Count 145 (L) 150 - 450 10e3/uL    % Neutrophils 60 %    % Lymphocytes 31 %    % Monocytes 8 %    %  Eosinophils 0 %    % Basophils 0 %    % Immature Granulocytes 0 %    Absolute Neutrophils 2.4 1.6 - 8.3 10e3/uL    Absolute Lymphocytes 1.2 0.8 - 5.3 10e3/uL    Absolute Monocytes 0.3 0.0 - 1.3 10e3/uL    Absolute Eosinophils 0.0 0.0 - 0.7 10e3/uL    Absolute Basophils 0.0 0.0 - 0.2 10e3/uL    Absolute Immature Granulocytes 0.0 <=0.4 10e3/uL       If you have any questions or concerns, please call the clinic at the number listed above.       Sincerely,      Veronica Bryant PA-C

## 2024-11-05 NOTE — PROGRESS NOTES
Preventive Care Visit  [facility]  Viktor Haas PA-C, Physician Assistant  Nov 5, 2024      Assessment & Plan     (Z00.00) Routine general medical examination at a health care facility  (primary encounter diagnosis)  Comment:   Plan: vaccines declined today for flu, covid and Hep B #3 based on working later today, advised to get locally ASAP.  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 Advised to continue with current medication regimen and follow up in 12 months. Stressed the importance of regular blood pressure monitoring outside of clinic, regular aerobic exercise, low salt diet, refraining from smoking/tobacco products and moderation of alcohol use.      (M10.072) Acute idiopathic gout involving toe of left foot  Comment:   Plan: stable on allopurinol, no recent flares, advised using NSAIDs sparingly with microalbuminuria    (R80.9) Microalbuminuria  Comment:   Plan: see above    (B35.1) Fungal infection of toenail  Comment:   Plan: ciclopirox (PENLAC) 8 % external solution        Etiology discussed today. Treatment options, medication side effects and expected course of recovery reviewed. If any worsening of symptoms, please contact the clinical team sooner, otherwise follow up as discussed    Podiatry referral sent for possible removal of L great toe nail    (L03.032) Paronychia of great toe of left foot  Comment:   Plan: cephALEXin (KEFLEX) 500 MG capsule, Orthopedic          " Referral        See above    (R30.0) Dysuria  Comment:   Plan: Urinalysis Macroscopic - lab collect            (R16.1) Splenomegaly  Comment:   Plan: CBC with platelets and differential        Recheck CBC today, may need re imaging of spleen if CBC abnormal    Patient has been advised of split billing requirements and indicates understanding: Yes        BMI  Estimated body mass index is 26.64 kg/m  as calculated from the following:    Height as of this encounter: 1.765 m (5' 9.5\").    Weight as of this encounter: 83 kg (183 lb).       Counseling  Appropriate preventive services were addressed with this patient via screening, questionnaire, or discussion as appropriate for fall prevention, nutrition, physical activity, Tobacco-use cessation, social engagement, weight loss and cognition.  Checklist reviewing preventive services available has been given to the patient.  Reviewed patient's diet, addressing concerns and/or questions.       FUTURE APPOINTMENTS:       - Follow-up visit in 3-6 months    Subjective   Lisa gabriel is a 54 year old, presenting for the following:  Diabetes (Left big toe) and Physical        11/5/2024    12:44 PM   Additional Questions   Roomed by Mariah SANTOYO          History of Present Illness       Diabetes:   He presents for follow up of diabetes.  He is checking home blood glucose a few times a week.   He checks blood glucose before meals.  Blood glucose is sometimes over 200 and never under 70.  When his blood glucose is low, the patient is asymptomatic for confusion, blurred vision, lethargy and reports not feeling dizzy, shaky, or weak.   He has no concerns regarding his diabetes at this time.  He is having numbness in feet and blurry vision.  The patient has not had a diabetic eye exam in the last 12 months.             Diabetes Follow-up    How often are you checking your blood sugar? A few times a week  What time of day are you checking your blood sugars (select all that apply)?  " Before and after meals  Have you had any blood sugars above 200?  Yes   Have you had any blood sugars below 70?  No  What symptoms do you notice when your blood sugar is low?  Shaky and Lethargy  What concerns do you have today about your diabetes? None   Do you have any of these symptoms? (Select all that apply)  no neuropathy, some L great toe redness and pain recently  Have you had a diabetic eye exam in the last 12 months? No, scheduled for 11/12/24        BP Readings from Last 2 Encounters:   11/05/24 136/76   09/10/24 (!) 152/88     Hemoglobin A1C (%)   Date Value   03/05/2024 7.4 (H)   11/21/2023 8.6 (H)   04/28/2021 6.5 (H)   01/12/2021 7.3 (H)     LDL Cholesterol Calculated (mg/dL)   Date Value   04/09/2024 51   02/28/2023 46   10/15/2020 29           Health Care Directive  Patient does not have a Health Care Directive: Discussed advance care planning with patient; however, patient declined at this time.      11/5/2024   General Health   How would you rate your overall physical health? (!) FAIR   Feel stress (tense, anxious, or unable to sleep) Not at all            11/5/2024   Nutrition   Three or more servings of calcium each day? Yes   Diet: Regular (no restrictions)   How many servings of fruit and vegetables per day? (!) 2-3   How many sweetened beverages each day? 0-1            11/5/2024   Exercise   Days per week of moderate/strenous exercise 7 days   Average minutes spent exercising at this level 30 min            11/5/2024   Social Factors   Frequency of gathering with friends or relatives Twice a week   Worry food won't last until get money to buy more Patient declined   Food not last or not have enough money for food? Patient declined   Do you have housing? (Housing is defined as stable permanent housing and does not include staying ouside in a car, in a tent, in an abandoned building, in an overnight shelter, or couch-surfing.) Patient declined   Are you worried about losing your housing?  "Patient declined   Lack of transportation? Patient declined   Unable to get utilities (heat,electricity)? Patient declined            11/5/2024   Fall Risk   Fallen 2 or more times in the past year? No    Trouble with walking or balance? No        Patient-reported          11/5/2024   Dental   Dentist two times every year? Yes            11/5/2024   TB Screening   Were you born outside of the [place]? Yes              Today's PHQ-2 Score:       4/9/2024     9:49 AM   PHQ-2 ( 1999 [employer])   Q1: Little interest or pleasure in doing things 0   Q2: Feeling down, depressed or hopeless 0   PHQ-2 Score 0         11/5/2024   Substance Use   Alcohol more than 3/day or more than 7/wk Not Applicable   Do you use any other substances recreationally? No        Social History     Tobacco Use    Smoking status: Never    Smokeless tobacco: Never   Vaping Use    Vaping status: Never Used   Substance Use Topics    Alcohol use: Not Currently     Comment: \"not since 2014 in [place]\"    Drug use: Never           11/5/2024   STI Screening   New sexual partner(s) since last STI/HIV test? No      Last PSA:   PSA   Date Value Ref Range Status   04/28/2021 0.59 0 - 4 ug/L Final     Comment:     Assay Method:  Chemiluminescence using Siemens Vista analyzer     Prostate Specific Antigen Screen   Date Value Ref Range Status   04/09/2024 0.66 0.00 - 3.50 ng/mL Final     ASCVD Risk   The ASCVD Risk score (Jeimy LEZAMA, et al., 2019) failed to calculate for the following reasons:    The valid total cholesterol range is 130 to 320 mg/dL           Reviewed and updated as needed this visit by Provider                    BP Readings from Last 3 Encounters:   11/05/24 136/76   09/10/24 (!) 152/88   08/20/24 113/71    Wt Readings from Last 3 Encounters:   11/05/24 83 kg (183 lb)   08/20/24 81.6 kg (180 lb)   08/20/24 81.6 kg (180 lb)               Objective    Exam  /76   Pulse 69   Temp 97.3  F (36.3  C) (Temporal)   Resp 16   Ht 1.765 m (5' " "9.5\")   Wt 83 kg (183 lb)   SpO2 98%   BMI 26.64 kg/m     Estimated body mass index is 26.64 kg/m  as calculated from the following:    Height as of this encounter: 1.765 m (5' 9.5\").    Weight as of this encounter: 83 kg (183 lb).    Physical Exam  GENERAL: alert and no distress  EYES: Eyes grossly normal to inspection, PERRL and conjunctivae and sclerae normal  HENT: ear canals and TM's normal, nose and mouth without ulcers or lesions  NECK: no adenopathy, no asymmetry, masses, or scars  RESP: lungs clear to auscultation - no rales, rhonchi or wheezes  CV: regular rate and rhythm, normal S1 S2, no S3 or S4, no murmur, click or rub, no peripheral edema  ABDOMEN: soft, nontender, no hepatosplenomegaly, no masses and bowel sounds normal  MS: no gross musculoskeletal defects noted, no edema  SKIN: no suspicious lesions or rashes  NEURO: Normal strength and tone, mentation intact and speech normal  PSYCH: mentation appears normal, affect normal/bright  Diabetic foot exam: normal DP and PT pulses, no trophic changes or ulcerative lesions, normal sensory exam, normal monofilament exam, onychomycosis, and nail exam onychomycosis of the toenails, ingrown nail at left great toe, and paronychia of L great toe        Signed Electronically by: Viktor Haas PA-C    "

## 2024-11-05 NOTE — LETTER
November 11, 2024      Lisa Cazares  [address]  [facility] [address]        Dear ,    We are writing to inform you of your test results.    The urine microalbumin test is elevated. This means there is some protein in the urine and your kidneys are being affected by the diabetes. Controlling the diabetes and blood pressure is the best treatment in order to keep this stable. You should avoid NSAIDs like ibuprofen as much as possible. Anybody that has this should be on lisinopril - as you already are - since this is protective for the kidneys.     A1C is better! 7.0 and goal is less than 7.0.     Other labs are normal/stable.    Sharona Bryant PA-C/am    Resulted Orders   Albumin Random Urine Quantitative with Creat Ratio   Result Value Ref Range    Creatinine Urine mg/dL 140.0 mg/dL      Comment:      The reference ranges have not been established in urine creatinine. The results should be integrated into the clinical context for interpretation.    Albumin Urine mg/L 138.0 mg/L      Comment:      The reference ranges have not been established in urine albumin. The results should be integrated into the clinical context for interpretation.    Albumin Urine mg/g Cr 98.57 (H) 0.00 - 17.00 mg/g Cr      Comment:      Microalbuminuria is defined as an albumin:creatinine ratio of 17 to 299 for males and 25 to 299 for females. A ratio of albumin:creatinine of 300 or higher is indicative of overt proteinuria.  Due to biologic variability, positive results should be confirmed by a second, first-morning random or 24-hour timed urine specimen. If there is discrepancy, a third specimen is recommended. When 2 out of 3 results are in the microalbuminuria range, this is evidence for incipient nephropathy and warrants increased efforts at glucose control, blood pressure control, and institution of therapy with an angiotensin-converting-enzyme (ACE) inhibitor (if the patient can tolerate it).     Hemoglobin A1c   Result  Value Ref Range    Estimated Average Glucose 154 (H) <117 mg/dL    Hemoglobin A1C 7.0 (H) 0.0 - 5.6 %      Comment:      Normal <5.7%   Prediabetes 5.7-6.4%    Diabetes 6.5% or higher     Note: Adopted from ADA consensus guidelines.   Uric acid   Result Value Ref Range    Uric Acid 4.2 3.4 - 7.0 mg/dL   Comprehensive metabolic panel (BMP + Alb, Alk Phos, ALT, AST, Total. Bili, TP)   Result Value Ref Range    Sodium 141 135 - 145 mmol/L    Potassium 3.7 3.4 - 5.3 mmol/L    Carbon Dioxide (CO2) 25 22 - 29 mmol/L    Anion Gap 12 7 - 15 mmol/L    Urea Nitrogen 10.8 6.0 - 20.0 mg/dL    Creatinine 0.84 0.67 - 1.17 mg/dL    GFR Estimate >90 >60 mL/min/1.73m2      Comment:      eGFR calculated using 2021 CKD-EPI equation.    Calcium 9.4 8.8 - 10.4 mg/dL      Comment:      Reference intervals for this test were updated on 7/16/2024 to reflect our healthy population more accurately. There may be differences in the flagging of prior results with similar values performed with this method. Those prior results can be interpreted in the context of the updated reference intervals.    Chloride 104 98 - 107 mmol/L    Glucose 108 (H) 70 - 99 mg/dL    Alkaline Phosphatase 74 40 - 150 U/L    AST 32 0 - 45 U/L    ALT 25 0 - 70 U/L    Protein Total 7.7 6.4 - 8.3 g/dL    Albumin 4.7 3.5 - 5.2 g/dL    Bilirubin Total 0.9 <=1.2 mg/dL   Urinalysis Macroscopic - lab collect   Result Value Ref Range    Color Urine Yellow Colorless, Straw, Light Yellow, Yellow    Appearance Urine Clear Clear    Glucose Urine Negative Negative mg/dL    Bilirubin Urine Negative Negative    Ketones Urine Negative Negative mg/dL    Specific Gravity Urine 1.020 1.003 - 1.035    Blood Urine Trace (A) Negative    pH Urine 6.0 5.0 - 7.0    Protein Albumin Urine 30 (A) Negative mg/dL    Urobilinogen Urine 0.2 0.2, 1.0 E.U./dL    Nitrite Urine Negative Negative    Leukocyte Esterase Urine Negative Negative   CBC with platelets and differential   Result Value Ref Range     WBC Count 3.9 (L) 4.0 - 11.0 10e3/uL    RBC Count 4.29 (L) 4.40 - 5.90 10e6/uL    Hemoglobin 14.3 13.3 - 17.7 g/dL    Hematocrit 41.9 40.0 - 53.0 %    MCV 98 78 - 100 fL    MCH 33.3 (H) 26.5 - 33.0 pg    MCHC 34.1 31.5 - 36.5 g/dL    RDW 12.7 10.0 - 15.0 %    Platelet Count 145 (L) 150 - 450 10e3/uL    % Neutrophils 60 %    % Lymphocytes 31 %    % Monocytes 8 %    % Eosinophils 0 %    % Basophils 0 %    % Immature Granulocytes 0 %    Absolute Neutrophils 2.4 1.6 - 8.3 10e3/uL    Absolute Lymphocytes 1.2 0.8 - 5.3 10e3/uL    Absolute Monocytes 0.3 0.0 - 1.3 10e3/uL    Absolute Eosinophils 0.0 0.0 - 0.7 10e3/uL    Absolute Basophils 0.0 0.0 - 0.2 10e3/uL    Absolute Immature Granulocytes 0.0 <=0.4 10e3/uL

## 2024-11-06 ENCOUNTER — PATIENT OUTREACH (OUTPATIENT)
Dept: CARE COORDINATION | Facility: CLINIC | Age: 54
End: 2024-11-06
Payer: COMMERCIAL

## 2024-11-06 LAB
ALBUMIN SERPL BCG-MCNC: 4.7 G/DL (ref 3.5–5.2)
ALP SERPL-CCNC: 74 U/L (ref 40–150)
ALT SERPL W P-5'-P-CCNC: 25 U/L (ref 0–70)
ANION GAP SERPL CALCULATED.3IONS-SCNC: 12 MMOL/L (ref 7–15)
AST SERPL W P-5'-P-CCNC: 32 U/L (ref 0–45)
BILIRUB SERPL-MCNC: 0.9 MG/DL
BUN SERPL-MCNC: 10.8 MG/DL (ref 6–20)
CALCIUM SERPL-MCNC: 9.4 MG/DL (ref 8.8–10.4)
CHLORIDE SERPL-SCNC: 104 MMOL/L (ref 98–107)
CREAT SERPL-MCNC: 0.84 MG/DL (ref 0.67–1.17)
CREAT UR-MCNC: 140 MG/DL
EGFRCR SERPLBLD CKD-EPI 2021: >90 ML/MIN/1.73M2
GLUCOSE SERPL-MCNC: 108 MG/DL (ref 70–99)
HCO3 SERPL-SCNC: 25 MMOL/L (ref 22–29)
MICROALBUMIN UR-MCNC: 138 MG/L
MICROALBUMIN/CREAT UR: 98.57 MG/G CR (ref 0–17)
POTASSIUM SERPL-SCNC: 3.7 MMOL/L (ref 3.4–5.3)
PROT SERPL-MCNC: 7.7 G/DL (ref 6.4–8.3)
SODIUM SERPL-SCNC: 141 MMOL/L (ref 135–145)
URATE SERPL-MCNC: 4.2 MG/DL (ref 3.4–7)

## 2024-11-12 ENCOUNTER — OFFICE VISIT (OUTPATIENT)
Dept: OPHTHALMOLOGY | Facility: CLINIC | Age: 54
End: 2024-11-12
Attending: PHYSICIAN ASSISTANT
Payer: COMMERCIAL

## 2024-11-12 DIAGNOSIS — H40.003 GLAUCOMA SUSPECT, BILATERAL: ICD-10-CM

## 2024-11-12 DIAGNOSIS — H52.13 MYOPIA OF BOTH EYES: ICD-10-CM

## 2024-11-12 DIAGNOSIS — H25.013 CORTICAL AGE-RELATED CATARACT OF BOTH EYES: ICD-10-CM

## 2024-11-12 DIAGNOSIS — H52.4 PRESBYOPIA: ICD-10-CM

## 2024-11-12 DIAGNOSIS — E11.3393 TYPE 2 DIABETES MELLITUS WITH BOTH EYES AFFECTED BY MODERATE NONPROLIFERATIVE RETINOPATHY WITHOUT MACULAR EDEMA, WITHOUT LONG-TERM CURRENT USE OF INSULIN (H): Primary | ICD-10-CM

## 2024-11-12 DIAGNOSIS — H52.223 REGULAR ASTIGMATISM OF BOTH EYES: ICD-10-CM

## 2024-11-12 PROCEDURE — 92015 DETERMINE REFRACTIVE STATE: CPT

## 2024-11-12 PROCEDURE — G0463 HOSPITAL OUTPT CLINIC VISIT: HCPCS

## 2024-11-12 ASSESSMENT — REFRACTION_MANIFEST
OS_AXIS: 080
OS_SPHERE: -1.25
OD_ADD: +2.50
OD_SPHERE: -2.00
OS_CYLINDER: +0.75
OD_CYLINDER: +0.50
METHOD_AUTOREFRACTION: 1
OD_AXIS: 110
OS_ADD: +2.50

## 2024-11-12 ASSESSMENT — CONF VISUAL FIELD
OS_SUPERIOR_NASAL_RESTRICTION: 0
OD_SUPERIOR_TEMPORAL_RESTRICTION: 0
OD_INFERIOR_TEMPORAL_RESTRICTION: 0
OD_NORMAL: 1
OD_SUPERIOR_NASAL_RESTRICTION: 0
OS_NORMAL: 1
OS_SUPERIOR_TEMPORAL_RESTRICTION: 0
OS_INFERIOR_TEMPORAL_RESTRICTION: 0
OD_INFERIOR_NASAL_RESTRICTION: 0
OS_INFERIOR_NASAL_RESTRICTION: 0

## 2024-11-12 ASSESSMENT — VISUAL ACUITY
OD_PH_SC: 20/25
OS_SC: 20/25
METHOD: SNELLEN - LINEAR
OD_SC: 20/60
OD_SC+: -2

## 2024-11-12 ASSESSMENT — EXTERNAL EXAM - RIGHT EYE: OD_EXAM: NORMAL

## 2024-11-12 ASSESSMENT — TONOMETRY
OD_IOP_MMHG: 13
OS_IOP_MMHG: 12
IOP_METHOD: TONOPEN
OS_IOP_MMHG: 15
IOP_METHOD: APPLANATION
OD_IOP_MMHG: 16

## 2024-11-12 ASSESSMENT — CUP TO DISC RATIO
OS_RATIO: 0.6
OD_RATIO: 0.7

## 2024-11-12 ASSESSMENT — SLIT LAMP EXAM - LIDS
COMMENTS: NORMAL
COMMENTS: NORMAL

## 2024-11-12 ASSESSMENT — EXTERNAL EXAM - LEFT EYE: OS_EXAM: NORMAL

## 2024-11-12 NOTE — PROGRESS NOTES
History  HPI       Diabetic Eye Exam    Vision is blurred for near and is blurred for distance.  Associated symptoms include blurred vision.  Negative for discharge, redness, itching, tearing, flashes and floaters.  Treatments tried include no treatments.  Pain was noted as 0/10. Additional comments: Diabetic eye exam              Comments    Patient reports decreased vision near and far.   Ocular meds: none   # [id number] Dong   BS yesterday mornin  Lab Results       Component                Value               Date                       A1C                      7.0                 2024                 A1C                      7.4                 2024                 A1C                      8.6                 2023                 A1C                      7.2                 2023                 A1C                      6.4                 2023                 A1C                      6.5                 2021                 A1C                      7.3                 2021                 A1C                      6.6                 10/15/2020            Darcy Menard OA 10:36 AM 2024             Last edited by Dracy Menard on 2024 10:42 AM.          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   -Moderate Non-proleferative Diabetic Retinopathy noted in this exam.   -Stressed importance of close BS/BP monitoring, diet/exercise, medication compliance, and regular visits with PCP.   -Discussed the importance of follow-up exams.   -RTC sooner if increased floaters, increased flashes, blurry or loss of vision.     (H25.013) Cortical age-related cataract of both eyes  Plan:   -Discussed signs and symptoms of cataracts.   -Not  Visually Significant  -Monitor.      (H40.003) Glaucoma suspect, bilateral  Plan:   -Patient educated on today's findings.  -IOP at 12/13 mmHg.  -No Family History of glaucoma  -OCT RNFL at next visit.     (H52.13) Myopia of both eyes, (H52.223) Regular astigmatism of both eyes, (H52.4) Presbyopia  Plan:   -New Glasses Rx Given.   -Discussed giving a couple of weeks to get adjusted to new glasses.   -Monitor.     Return to clinic in 6 months for VA, IOP, Mrx, Dilate, OCT RNFL, Fundus Photos.    Complete documentation of historical and exam elements from today's encounter can be found in the full encounter summary report (not reduplicated in this progress note). I personally obtained the chief complaint(s) and history of present illness. I confirmed and edited as necessary the review of systems, past medical/surgical history, family history, social history, and examination findings as document by others; and I examined the patient myself. I personally reviewed the relevant tests, images, and reports as documented above. I formulated and edited as necessary the assessment and plan and discussed the findings and management plan with the patient and family.    Joni Orozco OD

## 2024-11-12 NOTE — NURSING NOTE
Chief Complaints and History of Present Illnesses   Patient presents with    Diabetic Eye Exam     Diabetic eye exam      Chief Complaint(s) and History of Present Illness(es)       Diabetic Eye Exam              Vision: is blurred for near and is blurred for distance    Associated symptoms: blurred vision.  Negative for discharge, redness, itching, tearing, flashes and floaters    Treatments tried: no treatments    Pain scale: 0/10    Comments: Diabetic eye exam               Comments    Patient reports decreased vision near and far.   Ocular meds: none   # [id number] Dong   BS yesterday mornin  Lab Results       Component                Value               Date                       A1C                      7.0                 2024                 A1C                      7.4                 2024                 A1C                      8.6                 2023                 A1C                      7.2                 2023                 A1C                      6.4                 2023                 A1C                      6.5                 2021                 A1C                      7.3                 2021                 A1C                      6.6                 10/15/2020            Darcy QIU 10:36 AM 2024

## 2024-11-12 NOTE — LETTER
2024       RE: Lisa Cazares  [address]  [facility] [address]     Dear Colleague,    Thank you for referring your patient, Lisa Cazares, to the [facility] at [facility]. Please see a copy of my visit note below.    History  HPI       Diabetic Eye Exam    Vision is blurred for near and is blurred for distance.  Associated symptoms include blurred vision.  Negative for discharge, redness, itching, tearing, flashes and floaters.  Treatments tried include no treatments.  Pain was noted as 0/10. Additional comments: Diabetic eye exam              Comments    Patient reports decreased vision near and far.   Ocular meds: none   # [id number] Dong   BS yesterday mornin  Lab Results       Component                Value               Date                       A1C                      7.0                 2024                 A1C                      7.4                 2024                 A1C                      8.6                 2023                 A1C                      7.2                 2023                 A1C                      6.4                 2023                 A1C                      6.5                 2021                 A1C                      7.3                 2021                 A1C                      6.6                 10/15/2020            Darcy Menard OA 10:36 AM 2024             Last edited by Darcy Menard on 2024 10:42 AM.          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   -Moderate Non-proleferative Diabetic Retinopathy noted in this exam.   -Stressed importance of close BS/BP monitoring, diet/exercise,  medication compliance, and regular visits with PCP.   -Discussed the importance of follow-up exams.   -RTC sooner if increased floaters, increased flashes, blurry or loss of vision.     (H25.013) Cortical age-related cataract of both eyes  Plan:   -Discussed signs and symptoms of cataracts.   -Not Visually Significant  -Monitor.      (H40.003) Glaucoma suspect, bilateral  Plan:   -Patient educated on today's findings.  -IOP at 12/13 mmHg.  -No Family History of glaucoma  -OCT RNFL at next visit.     (H52.13) Myopia of both eyes, (H52.223) Regular astigmatism of both eyes, (H52.4) Presbyopia  Plan:   -New Glasses Rx Given.   -Discussed giving a couple of weeks to get adjusted to new glasses.   -Monitor.     Return to clinic in 6 months for VA, IOP, Mrx, Dilate, OCT RNFL, Fundus Photos.    Complete documentation of historical and exam elements from today's encounter can be found in the full encounter summary report (not reduplicated in this progress note). I personally obtained the chief complaint(s) and history of present illness. I confirmed and edited as necessary the review of systems, past medical/surgical history, family history, social history, and examination findings as document by others; and I examined the patient myself. I personally reviewed the relevant tests, images, and reports as documented above. I formulated and edited as necessary the assessment and plan and discussed the findings and management plan with the patient and family.    Joni Orozco OD        Again, thank you for allowing me to participate in the care of your patient.      Sincerely,    Joni Orozco Jr., OD

## 2024-11-14 NOTE — PATIENT INSTRUCTIONS
The affects of the dilating drops last for 4- 6 hours.  You will be more sensitive to light and vision will be blurry up close.  Do not drive if you do not feel comfortable.  Mydriatic sunglasses were given if needed.     Patient Education   Diabetes weakens the blood vessels all over the body, including the eyes. Damage to the blood vessels in the eyes can cause swelling or bleeding into part of the eye (called the retina). This is called diabetic retinopathy (LAST-tin--[facility]-thee). If not treated, this disease can cause vision loss or blindness.   Symptoms may include blurred or distorted vision, but many people have no symptoms. It's important to see your eye doctor regularly to check for problems.   Early treatment and good control can help protect your vision. Here are the things you can do to help prevent vision loss:      1. Keep your blood sugar levels under tight control.      2. Bring high blood pressure under control.      3. No smoking.      4. Have yearly dilated eye exams.

## 2024-12-02 DIAGNOSIS — M10.072 ACUTE IDIOPATHIC GOUT INVOLVING TOE OF LEFT FOOT: ICD-10-CM

## 2024-12-02 NOTE — TELEPHONE ENCOUNTER
Medication Question or Refill    Contacts       Contact Date/Time Type Contact Phone/Fax    12/02/2024 11:49 AM CST Phone (Incoming) Lisa Cazares (Self) [phone] (H)            What medication are you calling about (include dose and sig)?: allopurinol (ZYLOPRIM) 300 MG tablet     Preferred Pharmacy:   [id number] [address] [address]  Phone: [phone] Fax: [phone]      Controlled Substance Agreement on file:   CSA -- Patient Level:    CSA: None found at the patient level.       Who prescribed the medication?: PCP    Do you need a refill? Yes    When did you use the medication last?     Patient offered an appointment? No    Do you have any questions or concerns?  No      Could we send this information to you in [facility] or would you prefer to receive a phone call?:   Patient would prefer a phone call   Okay to leave a detailed message?: Yes at Home number on file [phone] (home)

## 2024-12-03 RX ORDER — ALLOPURINOL 300 MG/1
300 TABLET ORAL DAILY
Qty: 90 TABLET | Refills: 1 | OUTPATIENT
Start: 2024-12-03

## 2024-12-17 NOTE — LETTER
[facility]   Care Plan  About Me:    Patient Name:  Lisa Cazares    YOB: 1970  Age:         50 year old   [facility] MRN:    [id number] Telephone Information:  Home Phone [phone]   Mobile Not on file.       Address:  [address] [place] [address] Email address:  No e-mail address on record      Emergency Contact(s)    Name Relationship Lgl Grd Work Phone Home Phone Mobile Phone   1. [place], MATT DAVILA Friend   [phone]            Primary language:  Cantonese     needed? Yes   [facility] [employer] [facility]:  [phone] op. 1  Other communication barriers:    Preferred Method of Communication:     Current living arrangement:    Mobility Status/ Medical Equipment:          My Access Plan  Medical Emergency 911   Primary Clinic Line [facility] - [phone]   24 Hour Appointment Line [phone] or  [phone]) (toll-free)   24 Hour Nurse Line [phone] (toll-free)   Preferred Urgent Care     Preferred Hospital     Preferred Pharmacy [facility] [id number] [place] [address]     Behavioral Health Crisis Line The National Suicide Prevention Lifeline at 1-232.105.1518 or 913             My Care Team Members  Patient Care Team       Relationship Specialty Notifications Start End    Mark Peter PA-C PCP - General Physician Assistant  10/15/20     Phone: [phone] Fax: [phone]         [address] [place] [address]    Mark Peter PA-C Assigned PCP   9/18/20     Phone: [phone] Fax: [phone]         [address] [place] [address]                                My Medical and Care Information  Problem List   There is no problem list on file for this patient.     Current Medications and Allergies:  See printed Medication Report.    Care Coordination Start Date: No linked episodes   Frequency of Care Coordination:     Form Last Updated: 11/27/2020        Cephalexin Pregnancy And Lactation Text: This medication is Pregnancy Category B and considered safe during pregnancy.  It is also excreted in breast milk but can be used safely for shorter doses.

## 2025-02-15 ENCOUNTER — HEALTH MAINTENANCE LETTER (OUTPATIENT)
Age: 55
End: 2025-02-15

## 2025-03-31 DIAGNOSIS — E11.69 TYPE 2 DIABETES MELLITUS WITH OTHER SPECIFIED COMPLICATION, WITHOUT LONG-TERM CURRENT USE OF INSULIN (H): ICD-10-CM

## 2025-03-31 DIAGNOSIS — E11.319 DIABETIC RETINOPATHY OF BOTH EYES WITHOUT MACULAR EDEMA ASSOCIATED WITH TYPE 2 DIABETES MELLITUS, UNSPECIFIED RETINOPATHY SEVERITY (H): ICD-10-CM

## 2025-04-01 RX ORDER — METFORMIN HYDROCHLORIDE 500 MG/1
1500 TABLET, EXTENDED RELEASE ORAL
Qty: 270 TABLET | Refills: 0 | Status: SHIPPED | OUTPATIENT
Start: 2025-04-01

## 2025-04-01 RX ORDER — GLIPIZIDE 5 MG/1
5 TABLET ORAL DAILY
Qty: 90 TABLET | Refills: 0 | Status: SHIPPED | OUTPATIENT
Start: 2025-04-01

## 2025-04-08 ENCOUNTER — OFFICE VISIT (OUTPATIENT)
Dept: PODIATRY | Facility: CLINIC | Age: 55
End: 2025-04-08
Attending: PHYSICIAN ASSISTANT
Payer: COMMERCIAL

## 2025-04-08 DIAGNOSIS — M79.675 TOE PAIN, LEFT: ICD-10-CM

## 2025-04-08 DIAGNOSIS — L03.032 PARONYCHIA OF GREAT TOE OF LEFT FOOT: Primary | ICD-10-CM

## 2025-04-08 PROCEDURE — 11730 AVULSION NAIL PLATE SIMPLE 1: CPT | Mod: TA | Performed by: PODIATRIST

## 2025-04-08 NOTE — PATIENT INSTRUCTIONS
Thank you for choosing [facility] Podiatry / Foot & Ankle Surgery!    DR. REED'S CLINIC LOCATIONS:     [facility] TRIAGE LINE: [phone]   [address] [place] [address] APPOINTMENTS: [phone]   [place] [address] RADIOLOGY: [phone]   (Every other Tues - Wed - Fri PM) SET UP SURGERY: [phone]    PHYSICAL THERAPY: [phone]   [facility] BILLING QUESTIONS: [phone] [address] FAX: [phone]   [place] [address]    (Thurs & Fri AM)       DR. REED'S RECOMMENDATIONS FOR HEALING AFTER A NAIL REMOVAL PROCEDURE    1. Try to keep the bandage on until bedtime or the morning after your procedure.     2. Some bleeding is normal. If bleeding seems excessive to you, place ice on top of your foot for 15-20 minutes, elevate your foot above heart level and reinforce the dressing (add additional covering)    3. Over the counter pain medication (tylenol / ibuprofen), elevating your foot and cold application is all you should need for pain control. Pain is usually easily managed.      4. For 1-2 weeks, soak your foot twice a day in mild, skin friendly soap water solution for 15 minutes (dish soap, hand soap, body wash, etc). After soaking, blot the area dry and apply a regular band aid. An antibiotic ointment is not needed.  If the guaze dressing sticks to your toe, soak your foot for a few minutes with the dressing on. This should help loosen it.     6. It is normal to experience some discomfort and redness around the nail for several days following the procedure. Drainage will likely appear a red and/or yellow. This is normal. If your toe is still draining fluid after 2 weeks, continue soaking.    7. Initial discomfort might last for 2-3 days. You may resume with regular activity as soon as you want,  as long as you keep the wound clean, dry and follow the soaking instruction. It is recommended that you do not enter public swimming pools/hot tubs while your toe is  draining.    8. If you are experiencing worsening pain and redness or notice pus after 2-3 days please contact the clinic. Ask to speak with a triage nurse and they will inform our team of your symptoms and we can advise if a follow up is needed.      IF YOU HAD A PERMANENT NAIL REMOVAL PROCEDURE    - Healing will take longer and you might need to soak for 2-3 weeks. You are healing from the nail being removed and the chemical burn.  - Expect some drainage, crust  and redness. This is from the acid (phenol) that was applied and the chemical burn.  - After soaking, use a Q-tip to clean under and around the skin where the nail was removed. This helps get rid of the brownish material and helps the wound drain  - Sometimes it is hard to know if the redness and drainage is normal versus a developing infection. If in doubt, reach out to Dr. Croft's office via My Chart or phone.   - If redness extends back to the middle of the toe, you should have it looked at in clinic.     After 2-3 days, if you are experiencing worsening pain and redness, or notice pus, please contact the clinic. Ask to speak with a triage nurse and they will inform our team of your symptoms and we can advise if a follow up is needed.

## 2025-04-08 NOTE — LETTER
"4/8/2025      Lisa Cazares  [address]  [facility] [address]      Dear Colleague,    Thank you for referring your patient, Lisa Cazares, to the [facility]. Please see a copy of my visit note below.    ASSESSMENT:  Encounter Diagnoses   Name Primary?     Paronychia of great toe of left foot Yes     Toe pain, left      MEDICAL DECISION MAKING:  His problem is consistent with a type of ingrown toenail with pain at the eponychial region.  There is light erythema which might be from localized infection.  I think that removal of the nail is a reasonable request.  The procedure was reviewed as well as the postprocedural recommendations.  These were provided in the after visit summary.  I explained that there is nothing I am doing to help a new nail grow back differently.  Permanent removal is a future option, with no associated infection.    Nail Avulsion Procedure  (non permanent removal)    The procedure was discussed with the Lisa Cazares, including risk of infection, abnormal nail regrowth, and possible need for an additional future nail procedure.  Post-procedure home cares were reviewed. I explained that these cares are important for preventing infection and aiding in timely healing.   Verbal and written consent was obtained.   The site was marked and the \"Time Out\" called.     The base of the left great toe was injected with 3 cc of  2% Lidocaine plain.  The toe was then prepped with betadine solution.  A tourniquet was applied around the base of the toe for hemostasis.   Next it was checked for adequate anesthesia.      The nail was loosened from the nail bed and marginal soft tissue attachments with a blunt instrument. . Next, the nail was firmly grasped with a hemostat and removed. The underlying nail bed was inspected and no abnormalities seen.    The tourniquet was removed. Bacitracin ointment was applied to the nail bed, followed by a compressive dressing.  Lisa gabriel" "Debora tolerated the procedure well.      Lisa Cazares is instructed to watch for, and call if,  increasing redness, drainage, and pain after 2-3 days. Post procedure instructions were provided in the After Visit Summary.       Disclaimer: This note consists of symbols derived from keyboarding, dictation and/or voice recognition software. As a result, there may be errors in the script that have gone undetected. Please consider this when interpreting information found in this chart.    Lenard Croft, CODY, FACFAS, MS    [facility] of Podiatry/Foot & Ankle Surgery      ____________________________________________________________________    HPI:       I was asked by Viktor Haas PA-C  to evaluate Lisa Cazares in consultation for a paronychia of the left great toe.       Lisa Cazares presents today for removal of his left great toenail.  There is concern for paronychia.    Type 2 DM  Last Hgb A1C = 7.0%.    *  Past Medical History:   Diagnosis Date     Appendicitis     \"In [place]- still has appendix\"     Diabetes (H)     Type II     Gout      High cholesterol      Hypertension    *  *  Past Surgical History:   Procedure Laterality Date     COLONOSCOPY       COLONOSCOPY N/A 9/10/2024    Procedure: Colonoscopy;  Surgeon: Kiran Riggs MD;  Location:  GI     LAPAROSCOPY     *  *  Current Outpatient Medications   Medication Sig Dispense Refill     allopurinol (ZYLOPRIM) 300 MG tablet Take 1 tablet (300 mg) by mouth daily. 90 tablet 1     atorvastatin (LIPITOR) 10 MG tablet Take 1 tablet (10 mg) by mouth daily. 90 tablet 1     ciclopirox (PENLAC) 8 % external solution Apply to adjacent skin and affected nails daily.  Remove with alcohol every 7 days, then repeat. 6.6 mL 11     dicyclomine (BENTYL) 20 MG tablet Take 1 tablet (20 mg) by mouth 4 times daily as needed (abdominal pain) 40 tablet 0     glipiZIDE (GLUCOTROL) 5 MG tablet TAKE 1 TABLET(5 MG) BY MOUTH DAILY 90 tablet 0     indomethacin (INDOCIN) " 50 MG capsule Take 1 capsule (50 mg) by mouth 2 times daily (with meals). As needed for toe pain. Use 1-2 weeks. 30 capsule 0     lisinopril (ZESTRIL) 10 MG tablet Take 1 tablet (10 mg) by mouth daily. 90 tablet 1     metFORMIN (GLUCOPHAGE XR) 500 MG 24 hr tablet TAKE 3 TABLETS(1500 MG) BY MOUTH DAILY WITH DINNER 270 tablet 0     psyllium (METAMUCIL/KONSYL) capsule Take 1 capsule by mouth daily 90 capsule 3     vitamin E 400 units TABS Take 400 Units by mouth daily.           EXAM:    Vitals: There were no vitals taken for this visit.  BMI: There is no height or weight on file to calculate BMI.    Vasc:      Pedal pulses are palpable for the dorsalis pedis posterior tibial artery, bilateral foot.  Capillary fill time </= 3 seconds  Pedal skin appears well-perfused  Neuro:      Light touch sensation intact to all sensory nerve distributions, bilateral foot.  No apparent spastic contractures or other deformity secondary to neurologic compromise.  Derm:      The left hallux toenail is dystrophic.  There is a superficial skin injury at the lateral eponychial region.  Pain on palpation over the eponychium with light erythema of the distal toe.        Again, thank you for allowing me to participate in the care of your patient.        Sincerely,        Lenard Croft DPM    Electronically signed

## 2025-04-08 NOTE — PROGRESS NOTES
"ASSESSMENT:  Encounter Diagnoses   Name Primary?    Paronychia of great toe of left foot Yes    Toe pain, left      MEDICAL DECISION MAKING:  His problem is consistent with a type of ingrown toenail with pain at the eponychial region.  There is light erythema which might be from localized infection.  I think that removal of the nail is a reasonable request.  The procedure was reviewed as well as the postprocedural recommendations.  These were provided in the after visit summary.  I explained that there is nothing I am doing to help a new nail grow back differently.  Permanent removal is a future option, with no associated infection.    Nail Avulsion Procedure  (non permanent removal)    The procedure was discussed with the Lisa Cazares, including risk of infection, abnormal nail regrowth, and possible need for an additional future nail procedure.  Post-procedure home cares were reviewed. I explained that these cares are important for preventing infection and aiding in timely healing.   Verbal and written consent was obtained.   The site was marked and the \"Time Out\" called.     The base of the left great toe was injected with 3 cc of  2% Lidocaine plain.  The toe was then prepped with betadine solution.  A tourniquet was applied around the base of the toe for hemostasis.   Next it was checked for adequate anesthesia.      The nail was loosened from the nail bed and marginal soft tissue attachments with a blunt instrument. . Next, the nail was firmly grasped with a hemostat and removed. The underlying nail bed was inspected and no abnormalities seen.    The tourniquet was removed. Bacitracin ointment was applied to the nail bed, followed by a compressive dressing.  Lisa Cazares tolerated the procedure well.      Lisa Cazares is instructed to watch for, and call if,  increasing redness, drainage, and pain after 2-3 days. Post procedure instructions were provided in the After Visit Summary.       Disclaimer: This " "note consists of symbols derived from keyboarding, dictation and/or voice recognition software. As a result, there may be errors in the script that have gone undetected. Please consider this when interpreting information found in this chart.    Lenard Croft DPM, FACFAS, MS    [facility] of Podiatry/Foot & Ankle Surgery      ____________________________________________________________________    HPI:       I was asked by Viktor Haas PA-C  to evaluate Lisa Cazares in consultation for a paronychia of the left great toe.       Lisa Cazares presents today for removal of his left great toenail.  There is concern for paronychia.    Type 2 DM  Last Hgb A1C = 7.0%.    *  Past Medical History:   Diagnosis Date    Appendicitis     \"In [place]- still has appendix\"    Diabetes (H)     Type II    Gout     High cholesterol     Hypertension    *  *  Past Surgical History:   Procedure Laterality Date    COLONOSCOPY      COLONOSCOPY N/A 9/10/2024    Procedure: Colonoscopy;  Surgeon: Kiran Riggs MD;  Location:  GI    LAPAROSCOPY     *  *  Current Outpatient Medications   Medication Sig Dispense Refill    allopurinol (ZYLOPRIM) 300 MG tablet Take 1 tablet (300 mg) by mouth daily. 90 tablet 1    atorvastatin (LIPITOR) 10 MG tablet Take 1 tablet (10 mg) by mouth daily. 90 tablet 1    ciclopirox (PENLAC) 8 % external solution Apply to adjacent skin and affected nails daily.  Remove with alcohol every 7 days, then repeat. 6.6 mL 11    dicyclomine (BENTYL) 20 MG tablet Take 1 tablet (20 mg) by mouth 4 times daily as needed (abdominal pain) 40 tablet 0    glipiZIDE (GLUCOTROL) 5 MG tablet TAKE 1 TABLET(5 MG) BY MOUTH DAILY 90 tablet 0    indomethacin (INDOCIN) 50 MG capsule Take 1 capsule (50 mg) by mouth 2 times daily (with meals). As needed for toe pain. Use 1-2 weeks. 30 capsule 0    lisinopril (ZESTRIL) 10 MG tablet Take 1 tablet (10 mg) by mouth daily. 90 tablet 1    metFORMIN (GLUCOPHAGE XR) 500 MG 24 hr " tablet TAKE 3 TABLETS(1500 MG) BY MOUTH DAILY WITH DINNER 270 tablet 0    psyllium (METAMUCIL/KONSYL) capsule Take 1 capsule by mouth daily 90 capsule 3    vitamin E 400 units TABS Take 400 Units by mouth daily.           EXAM:    Vitals: There were no vitals taken for this visit.  BMI: There is no height or weight on file to calculate BMI.    Vasc:      Pedal pulses are palpable for the dorsalis pedis posterior tibial artery, bilateral foot.  Capillary fill time </= 3 seconds  Pedal skin appears well-perfused  Neuro:      Light touch sensation intact to all sensory nerve distributions, bilateral foot.  No apparent spastic contractures or other deformity secondary to neurologic compromise.  Derm:      The left hallux toenail is dystrophic.  There is a superficial skin injury at the lateral eponychial region.  Pain on palpation over the eponychium with light erythema of the distal toe.

## 2025-04-29 DIAGNOSIS — E11.69 TYPE 2 DIABETES MELLITUS WITH OTHER SPECIFIED COMPLICATION, WITHOUT LONG-TERM CURRENT USE OF INSULIN (H): ICD-10-CM

## 2025-04-29 RX ORDER — ATORVASTATIN CALCIUM 10 MG/1
10 TABLET, FILM COATED ORAL DAILY
Qty: 90 TABLET | Refills: 0 | Status: SHIPPED | OUTPATIENT
Start: 2025-04-29

## 2025-04-29 NOTE — TELEPHONE ENCOUNTER
Requested Prescriptions   Pending Prescriptions Disp Refills    atorvastatin (LIPITOR) 10 MG tablet [Pharmacy Med Name: ATORVASTATIN 10MG TABLETS] 90 tablet 1     Sig: TAKE 1 TABLET(10 MG) BY MOUTH DAILY       Antihyperlipidemic agents Failed - 4/29/2025  1:20 PM        Failed - LDL on file in the past 12 months        Passed - Medication is active on med list and the sig matches. RN to manually verify dose and sig if red X/fail.     If the protocol passes (green check), you do not need to verify med dose and sig.    A prescription matches if they are the same clinical intention.    For Example: once daily and every morning are the same.    The protocol can not identify upper and lower case letters as matching and will fail.     For Example: Take 1 tablet (50 mg) by mouth daily     TAKE 1 TABLET (50 MG) BY MOUTH DAILY    For all fails (red x), verify dose and sig.    If the refill does match what is on file, the RN can still proceed to approve the refill request.       If they do not match, route to the appropriate provider.             Passed - Recent (12 mo) or future (90 days) visit within the authorizing provider's specialty     The patient must have completed an in-person or virtual visit within the past 12 months or has a future visit scheduled within the next 90 days with the authorizing provider s specialty.  Urgent care and e-visits do not qualify as an office visit for this protocol.          Passed - Patient is age 18 years or older

## 2025-04-30 DIAGNOSIS — E11.69 TYPE 2 DIABETES MELLITUS WITH OTHER SPECIFIED COMPLICATION, WITHOUT LONG-TERM CURRENT USE OF INSULIN (H): ICD-10-CM

## 2025-04-30 RX ORDER — ATORVASTATIN CALCIUM 10 MG/1
10 TABLET, FILM COATED ORAL DAILY
Qty: 90 TABLET | Refills: 0 | OUTPATIENT
Start: 2025-04-30

## 2025-05-17 ENCOUNTER — HEALTH MAINTENANCE LETTER (OUTPATIENT)
Age: 55
End: 2025-05-17

## 2025-05-19 DIAGNOSIS — E11.3393 TYPE 2 DIABETES MELLITUS WITH BOTH EYES AFFECTED BY MODERATE NONPROLIFERATIVE RETINOPATHY WITHOUT MACULAR EDEMA, WITHOUT LONG-TERM CURRENT USE OF INSULIN (H): ICD-10-CM

## 2025-05-19 DIAGNOSIS — H40.003 GLAUCOMA SUSPECT, BILATERAL: Primary | ICD-10-CM

## 2025-05-20 ENCOUNTER — OFFICE VISIT (OUTPATIENT)
Dept: OPHTHALMOLOGY | Facility: CLINIC | Age: 55
End: 2025-05-20
Payer: COMMERCIAL

## 2025-05-20 DIAGNOSIS — E11.3393 TYPE 2 DIABETES MELLITUS WITH BOTH EYES AFFECTED BY MODERATE NONPROLIFERATIVE RETINOPATHY WITHOUT MACULAR EDEMA, WITHOUT LONG-TERM CURRENT USE OF INSULIN (H): Primary | ICD-10-CM

## 2025-05-20 DIAGNOSIS — H52.223 REGULAR ASTIGMATISM OF BOTH EYES: ICD-10-CM

## 2025-05-20 DIAGNOSIS — H52.13 MYOPIA OF BOTH EYES: ICD-10-CM

## 2025-05-20 DIAGNOSIS — H40.003 GLAUCOMA SUSPECT, BILATERAL: ICD-10-CM

## 2025-05-20 DIAGNOSIS — H04.123 DRY EYE SYNDROME OF BOTH EYES: ICD-10-CM

## 2025-05-20 DIAGNOSIS — H25.013 CORTICAL AGE-RELATED CATARACT OF BOTH EYES: ICD-10-CM

## 2025-05-20 PROCEDURE — 92133 CPTRZD OPH DX IMG PST SGM ON: CPT

## 2025-05-20 PROCEDURE — 92015 DETERMINE REFRACTIVE STATE: CPT

## 2025-05-20 PROCEDURE — 92250 FUNDUS PHOTOGRAPHY W/I&R: CPT

## 2025-05-20 PROCEDURE — G0463 HOSPITAL OUTPT CLINIC VISIT: HCPCS

## 2025-05-20 ASSESSMENT — TONOMETRY
OS_IOP_MMHG: 13
OD_IOP_MMHG: 14
OD_IOP_MMHG: 16
IOP_METHOD: TONOPEN
OS_IOP_MMHG: 15
IOP_METHOD: APPLANATION

## 2025-05-20 ASSESSMENT — VISUAL ACUITY
OS_PH_SC: 20/30
OD_PH_SC+: -2
OD_PH_SC: 20/40
OS_SC: 20/40
OD_SC+: -1
METHOD: SNELLEN - LINEAR
OD_SC: 20/60

## 2025-05-20 ASSESSMENT — CONF VISUAL FIELD
OS_NORMAL: 1
OS_SUPERIOR_TEMPORAL_RESTRICTION: 0
OS_INFERIOR_NASAL_RESTRICTION: 0
OD_INFERIOR_TEMPORAL_RESTRICTION: 0
OS_SUPERIOR_NASAL_RESTRICTION: 0
OD_SUPERIOR_TEMPORAL_RESTRICTION: 0
OS_INFERIOR_TEMPORAL_RESTRICTION: 0
METHOD: COUNTING FINGERS
OD_SUPERIOR_NASAL_RESTRICTION: 0
OD_INFERIOR_NASAL_RESTRICTION: 0
OD_NORMAL: 1

## 2025-05-20 ASSESSMENT — PACHYMETRY
OS_CT(UM): 565
OD_CT(UM): 563

## 2025-05-20 ASSESSMENT — CUP TO DISC RATIO
OS_RATIO: 0.6
OD_RATIO: 0.7

## 2025-05-20 ASSESSMENT — REFRACTION_MANIFEST
OD_CYLINDER: +0.50
OD_SPHERE: -2.25
OD_AXIS: 140
OS_SPHERE: -1.25
OS_AXIS: 076
OD_ADD: +2.50
OS_CYLINDER: +0.75
OS_ADD: +2.50

## 2025-05-20 ASSESSMENT — SLIT LAMP EXAM - LIDS
COMMENTS: NORMAL
COMMENTS: NORMAL

## 2025-05-20 ASSESSMENT — EXTERNAL EXAM - LEFT EYE: OS_EXAM: NORMAL

## 2025-05-20 ASSESSMENT — EXTERNAL EXAM - RIGHT EYE: OD_EXAM: NORMAL

## 2025-05-20 NOTE — NURSING NOTE
Chief Complaints and History of Present Illnesses   Patient presents with    Diabetic Retinopathy Follow Up     6 month follow up both eyes     Chief Complaint(s) and History of Present Illness(es)       Diabetic Retinopathy Follow Up              Comments: 6 month follow up both eyes              Comments    Pt here with  over the phone today.   Pt states vision is the same as last visit. No eye pain today. No new flashes or floaters. No redness. Dryness in both eyes upon waking. Pt does not use any eye drops.  DM2 BS: 150  Lab Results       Component                Value               Date                       A1C                      7.0                 11/05/2024                 A1C                      7.4                 03/05/2024                 A1C                      8.6                 11/21/2023                 A1C                      7.2                 08/08/2023                 A1C                      6.4                 02/28/2023                 A1C                      6.5                 04/28/2021                 A1C                      7.3                 01/12/2021                 A1C                      6.6                 10/15/2020              MORGAN Robles May 20, 2025 11:37 AM

## 2025-05-20 NOTE — LETTER
5/20/2025       RE: Lisa Cazares  [address]  [facility] [address]     Dear Colleague,    Thank you for referring your patient, Lisa Cazares, to the [facility]. Please see a copy of my visit note below.    History  HPI       Diabetic Retinopathy Follow Up     Additional comments: 6 month follow up both eyes             Comments    Pt here with  over the phone today.   Pt states vision is the same as last visit. No eye pain today. No new flashes or floaters. No redness. Dryness in both eyes upon waking. Pt does not use any eye drops.  DM2 BS: 150  Lab Results       Component                Value               Date                       A1C                      7.0                 11/05/2024                 A1C                      7.4                 03/05/2024                 A1C                      8.6                 11/21/2023                 A1C                      7.2                 08/08/2023                 A1C                      6.4                 02/28/2023                 A1C                      6.5                 04/28/2021                 A1C                      7.3                 01/12/2021                 A1C                      6.6                 10/15/2020              MORGAN Robles May 20, 2025 11:37 AM              Last edited by Lindsey Bruner COMT on 5/20/2025 11:39 AM.          Assessment/Plan  (E11.5893) Type 2 diabetes mellitus with both eyes affected by moderate nonproliferative retinopathy without macular edema, without long-term current use of insulin (H)  (primary encounter diagnosis)  Plan:   -Patient educated on condition.   -Similar DFE noted today compared to last visit.   -Moderate Non-proleferative Diabetic Retinopathy noted in this exam.   -Stressed importance of close BS/BP monitoring, diet/exercise, medication compliance, and regular visits with  PCP.   -Discussed the importance of follow-up exams.   -RTC sooner if increased floaters, increased flashes, blurry or loss of vision.     (H25.013) Cortical age-related cataract of both eyes  Plan:   -Discussed signs and symptoms of cataracts.   -Not Visually Significant  -Monitor.      (H40.003) Glaucoma suspect, bilateral  Plan:   -Patient educated on today's findings.  -Pachy 563/565  -IOP at 16/15 mmHg. IOP still WNL.  -OCT RNFL. Borderline thinning right eye compared to left eye.  -No Family History of glaucoma  -No treatment at this time due to IOP. Will consider treatment depending on VF.     (H52.13) Myopia of both eyes, (H52.223) Regular astigmatism of both eyes, (H52.4) Presbyopia  Plan:   -New Glasses Rx Given on 11/12/2024.   -Monitor.     Return to clinic in 6 months for VA, IOP, Mrx, 24-2 VF, Fundus Photos, Dilate.    Complete documentation of historical and exam elements from today's encounter can be found in the full encounter summary report (not reduplicated in this progress note). I personally obtained the chief complaint(s) and history of present illness. I confirmed and edited as necessary the review of systems, past medical/surgical history, family history, social history, and examination findings as document by others; and I examined the patient myself. I personally reviewed the relevant tests, images, and reports as documented above. I formulated and edited as necessary the assessment and plan and discussed the findings and management plan with the patient and family.    Joni Orozco OD      Again, thank you for allowing me to participate in the care of your patient.      Sincerely,    Joni Orozco Jr., OD

## 2025-05-20 NOTE — PROGRESS NOTES
History  HPI       Diabetic Retinopathy Follow Up     Additional comments: 6 month follow up both eyes             Comments    Pt here with  over the phone today.   Pt states vision is the same as last visit. No eye pain today. No new flashes or floaters. No redness. Dryness in both eyes upon waking. Pt does not use any eye drops.  DM2 BS: 150  Lab Results       Component                Value               Date                       A1C                      7.0                 11/05/2024                 A1C                      7.4                 03/05/2024                 A1C                      8.6                 11/21/2023                 A1C                      7.2                 08/08/2023                 A1C                      6.4                 02/28/2023                 A1C                      6.5                 04/28/2021                 A1C                      7.3                 01/12/2021                 A1C                      6.6                 10/15/2020              MORGAN Robles May 20, 2025 11:37 AM              Last edited by Lindsey Bruner COMT on 5/20/2025 11:39 AM.          Assessment/Plan  (E11.6753) Type 2 diabetes mellitus with both eyes affected by moderate nonproliferative retinopathy without macular edema, without long-term current use of insulin (H)  (primary encounter diagnosis)  Plan:   -Patient educated on condition.   -Similar DFE noted today compared to last visit.   -Moderate Non-proleferative Diabetic Retinopathy noted in this exam.   -Stressed importance of close BS/BP monitoring, diet/exercise, medication compliance, and regular visits with PCP.   -Discussed the importance of follow-up exams.   -RTC sooner if increased floaters, increased flashes, blurry or loss of vision.     (H25.013) Cortical age-related cataract of both eyes  Plan:   -Discussed signs and symptoms of cataracts.   -Not Visually Significant  -Monitor.      (H40.003)  Glaucoma suspect, bilateral  Plan:   -Patient educated on today's findings.  -Pachy 563/565  -IOP at 16/15 mmHg. IOP still WNL.  -OCT RNFL. Borderline thinning right eye compared to left eye.  -No Family History of glaucoma  -No treatment at this time due to IOP. Will consider treatment depending on VF.     (H52.13) Myopia of both eyes, (H52.223) Regular astigmatism of both eyes, (H52.4) Presbyopia  Plan:   -New Glasses Rx Given on 11/12/2024.   -Monitor.     Return to clinic in 6 months for VA, IOP, Mrx, 24-2 VF, Fundus Photos, Dilate.    Complete documentation of historical and exam elements from today's encounter can be found in the full encounter summary report (not reduplicated in this progress note). I personally obtained the chief complaint(s) and history of present illness. I confirmed and edited as necessary the review of systems, past medical/surgical history, family history, social history, and examination findings as document by others; and I examined the patient myself. I personally reviewed the relevant tests, images, and reports as documented above. I formulated and edited as necessary the assessment and plan and discussed the findings and management plan with the patient and family.    Joni Orozco OD

## 2025-05-20 NOTE — PATIENT INSTRUCTIONS
The affects of the dilating drops last for 4- 6 hours.  You will be more sensitive to light and vision will be blurry up close.  Do not drive if you do not feel comfortable.  Mydriatic sunglasses were given if needed.     Patient Education   Diabetes weakens the blood vessels all over the body, including the eyes. Damage to the blood vessels in the eyes can cause swelling or bleeding into part of the eye (called the retina). This is called diabetic retinopathy (LAST-tin--[facility]-thee). If not treated, this disease can cause vision loss or blindness.   Symptoms may include blurred or distorted vision, but many people have no symptoms. It's important to see your eye doctor regularly to check for problems.   Early treatment and good control can help protect your vision. Here are the things you can do to help prevent vision loss:      1. Keep your blood sugar levels under tight control.      2. Bring high blood pressure under control.      3. No smoking.      4. Have yearly dilated eye exams.

## 2025-05-27 ENCOUNTER — OFFICE VISIT (OUTPATIENT)
Dept: FAMILY MEDICINE | Facility: CLINIC | Age: 55
End: 2025-05-27
Payer: COMMERCIAL

## 2025-05-27 ENCOUNTER — RESULTS FOLLOW-UP (OUTPATIENT)
Dept: FAMILY MEDICINE | Facility: CLINIC | Age: 55
End: 2025-05-27

## 2025-05-27 VITALS
HEART RATE: 75 BPM | BODY MASS INDEX: 26.67 KG/M2 | DIASTOLIC BLOOD PRESSURE: 72 MMHG | TEMPERATURE: 97.3 F | RESPIRATION RATE: 15 BRPM | HEIGHT: 68 IN | SYSTOLIC BLOOD PRESSURE: 124 MMHG | WEIGHT: 176 LBS | OXYGEN SATURATION: 100 %

## 2025-05-27 DIAGNOSIS — E11.9 TYPE 2 DIABETES MELLITUS WITHOUT COMPLICATION, WITHOUT LONG-TERM CURRENT USE OF INSULIN (H): Primary | ICD-10-CM

## 2025-05-27 DIAGNOSIS — E11.319 DIABETIC RETINOPATHY OF BOTH EYES WITHOUT MACULAR EDEMA ASSOCIATED WITH TYPE 2 DIABETES MELLITUS, UNSPECIFIED RETINOPATHY SEVERITY (H): ICD-10-CM

## 2025-05-27 DIAGNOSIS — M10.072 ACUTE IDIOPATHIC GOUT INVOLVING TOE OF LEFT FOOT: ICD-10-CM

## 2025-05-27 DIAGNOSIS — I10 BENIGN ESSENTIAL HYPERTENSION: ICD-10-CM

## 2025-05-27 DIAGNOSIS — E11.69 TYPE 2 DIABETES MELLITUS WITH OTHER SPECIFIED COMPLICATION, WITHOUT LONG-TERM CURRENT USE OF INSULIN (H): ICD-10-CM

## 2025-05-27 DIAGNOSIS — R80.9 MICROALBUMINURIA: ICD-10-CM

## 2025-05-27 LAB
ALBUMIN SERPL BCG-MCNC: 4.6 G/DL (ref 3.5–5.2)
ALP SERPL-CCNC: 76 U/L (ref 40–150)
ALT SERPL W P-5'-P-CCNC: 30 U/L (ref 0–70)
ANION GAP SERPL CALCULATED.3IONS-SCNC: 11 MMOL/L (ref 7–15)
AST SERPL W P-5'-P-CCNC: 35 U/L (ref 0–45)
BILIRUB SERPL-MCNC: 0.9 MG/DL
BUN SERPL-MCNC: 15.9 MG/DL (ref 6–20)
CALCIUM SERPL-MCNC: 10.1 MG/DL (ref 8.8–10.4)
CHLORIDE SERPL-SCNC: 103 MMOL/L (ref 98–107)
CHOLEST SERPL-MCNC: 104 MG/DL
CREAT SERPL-MCNC: 0.92 MG/DL (ref 0.67–1.17)
EGFRCR SERPLBLD CKD-EPI 2021: >90 ML/MIN/1.73M2
EST. AVERAGE GLUCOSE BLD GHB EST-MCNC: 143 MG/DL
FASTING STATUS PATIENT QL REPORTED: ABNORMAL
FASTING STATUS PATIENT QL REPORTED: NORMAL
GLUCOSE SERPL-MCNC: 83 MG/DL (ref 70–99)
HBA1C MFR BLD: 6.6 % (ref 0–5.6)
HCO3 SERPL-SCNC: 27 MMOL/L (ref 22–29)
HDLC SERPL-MCNC: 38 MG/DL
LDLC SERPL CALC-MCNC: 40 MG/DL
NONHDLC SERPL-MCNC: 66 MG/DL
POTASSIUM SERPL-SCNC: 4.6 MMOL/L (ref 3.4–5.3)
PROT SERPL-MCNC: 7.7 G/DL (ref 6.4–8.3)
SODIUM SERPL-SCNC: 141 MMOL/L (ref 135–145)
TRIGL SERPL-MCNC: 132 MG/DL
URATE SERPL-MCNC: 3.9 MG/DL (ref 3.4–7)

## 2025-05-27 PROCEDURE — 83036 HEMOGLOBIN GLYCOSYLATED A1C: CPT | Performed by: FAMILY MEDICINE

## 2025-05-27 PROCEDURE — 36415 COLL VENOUS BLD VENIPUNCTURE: CPT | Performed by: FAMILY MEDICINE

## 2025-05-27 PROCEDURE — 80061 LIPID PANEL: CPT | Performed by: FAMILY MEDICINE

## 2025-05-27 PROCEDURE — 80053 COMPREHEN METABOLIC PANEL: CPT | Performed by: FAMILY MEDICINE

## 2025-05-27 PROCEDURE — 84550 ASSAY OF BLOOD/URIC ACID: CPT | Performed by: FAMILY MEDICINE

## 2025-05-27 NOTE — PROGRESS NOTES
"  Assessment & Plan     Type 2 diabetes mellitus without complication, without long-term current use of insulin (H)  Diabetes, controlled  Reviewed and discussed A1c of 6.6 today  Counseled patient on lifestyle modifications including healthy diet and at least 150 minutes of moderate intensity exercise per week  Refill sent to pharmacy for chronic medications as noted below  Follow up in 6 months for recheck  Follow up with eye doctor as scheduled  - HEMOGLOBIN A1C  - Lipid panel reflex to direct LDL Non-fasting  - Comprehensive metabolic panel (BMP + Alb, Alk Phos, ALT, AST, Total. Bili, TP)  - atorvastatin (LIPITOR) 10 MG tablet; Take 1 tablet (10 mg) by mouth daily.  - glipiZIDE (GLUCOTROL) 5 MG tablet; Take 1 tablet (5 mg) by mouth daily.  - lisinopril (ZESTRIL) 10 MG tablet; Take 1 tablet (10 mg) by mouth daily.  - metFORMIN (GLUCOPHAGE XR) 500 MG 24 hr tablet; Take 3 tablets (1,500 mg) by mouth daily (with dinner).    Acute idiopathic gout involving toe of left foot  - Uric acid  - allopurinol (ZYLOPRIM) 300 MG tablet; Take 1 tablet (300 mg) by mouth daily.    Microalbuminuria  - lisinopril (ZESTRIL) 10 MG tablet; Take 1 tablet (10 mg) by mouth daily.    Benign essential hypertension  - lisinopril (ZESTRIL) 10 MG tablet; Take 1 tablet (10 mg) by mouth daily.    BMI  Estimated body mass index is 26.67 kg/m  as calculated from the following:    Height as of this encounter: 1.73 m (5' 8.11\").    Weight as of this encounter: 79.8 kg (176 lb).             Subjective   Lisa gabriel is a 55 year old, presenting for the following health issues:  Recheck Medication and Diabetes    History of Present Illness       Reason for visit:  Check med   He is taking medications regularly.      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  Takes all medications as prescribed.  Denies any side effects.  Denies smoking, drinking or drug use  Does measure BP at home, reports usually 130/80's    Overdue for Hep B 3/3, prefers to get it next time                    Objective    /77 (BP Location: Right arm, Patient Position: Chair, Cuff Size: Adult Regular)   Pulse 75   Temp 97.3  F (36.3  C) (Temporal)   Resp 15   Ht 1.73 m (5' 8.11\")   Wt 79.8 kg (176 lb)   SpO2 100%   BMI 26.67 kg/m    Body mass index is 26.67 kg/m .  Physical Exam   GENERAL: alert and no distress  RESP: lungs clear to auscultation - no rales, rhonchi or wheezes  CV: regular rate and rhythm, normal S1 S2, no S3 or S4, no murmur, click or rub, no peripheral edema  MS: no gross musculoskeletal defects noted, no edema  PSYCH: mentation appears normal, affect normal/bright            Signed Electronically by: Kennedy Tate MD    "

## 2025-05-27 NOTE — LETTER
May 29, 2025      Lisa Cazares  [address]  [facility] [address]        Dear ,    We are writing to inform you of your test results.    Your recent lipid panel shows mildly low HDL but otherwise is at goal. CMP and uric acid levels are normal. A1c is at goal as we discussed in office. Refill sent to pharmacy for your medications. Please follow-up in 6 months for diabetes follow-up.     Resulted Orders   HEMOGLOBIN A1C   Result Value Ref Range    Estimated Average Glucose 143 (H) <117 mg/dL    Hemoglobin A1C 6.6 (H) 0.0 - 5.6 %      Comment:      Normal <5.7%   Prediabetes 5.7-6.4%    Diabetes 6.5% or higher     Note: Adopted from ADA consensus guidelines.   Lipid panel reflex to direct LDL Non-fasting   Result Value Ref Range    Cholesterol 104 <200 mg/dL    Triglycerides 132 <150 mg/dL    Direct Measure HDL 38 (L) >=40 mg/dL    LDL Cholesterol Calculated 40 <100 mg/dL    Non HDL Cholesterol 66 <130 mg/dL    Patient Fasting > 8hrs? Unknown     Narrative    Cholesterol  Desirable: < 200 mg/dL  Borderline High: 200 - 239 mg/dL  High: >= 240 mg/dL    Triglycerides  Normal: < 150 mg/dL  Borderline High: 150 - 199 mg/dL  High: 200-499 mg/dL  Very High: >= 500 mg/dL    Direct Measure HDL  Female: >= 50 mg/dL   Male: >= 40 mg/dL    LDL Cholesterol  Desirable: < 100 mg/dL  Above Desirable: 100 - 129 mg/dL   Borderline High: 130 - 159 mg/dL   High:  160 - 189 mg/dL   Very High: >= 190 mg/dL    Non HDL Cholesterol  Desirable: < 130 mg/dL  Above Desirable: 130 - 159 mg/dL  Borderline High: 160 - 189 mg/dL  High: 190 - 219 mg/dL  Very High: >= 220 mg/dL   Comprehensive metabolic panel (BMP + Alb, Alk Phos, ALT, AST, Total. Bili, TP)   Result Value Ref Range    Sodium 141 135 - 145 mmol/L    Potassium 4.6 3.4 - 5.3 mmol/L    Carbon Dioxide (CO2) 27 22 - 29 mmol/L    Anion Gap 11 7 - 15 mmol/L    Urea Nitrogen 15.9 6.0 - 20.0 mg/dL    Creatinine 0.92 0.67 - 1.17 mg/dL    GFR Estimate >90 >60 mL/min/1.73m2       Comment:      eGFR calculated using 2021 CKD-EPI equation.    Calcium 10.1 8.8 - 10.4 mg/dL    Chloride 103 98 - 107 mmol/L    Glucose 83 70 - 99 mg/dL    Alkaline Phosphatase 76 40 - 150 U/L    AST 35 0 - 45 U/L    ALT 30 0 - 70 U/L    Protein Total 7.7 6.4 - 8.3 g/dL    Albumin 4.6 3.5 - 5.2 g/dL    Bilirubin Total 0.9 <=1.2 mg/dL    Patient Fasting > 8hrs? Unknown    Uric acid   Result Value Ref Range    Uric Acid 3.9 3.4 - 7.0 mg/dL       If you have any questions or concerns, please call the clinic at the number listed above.       Sincerely,  Kennedy Tate MD    Electronically signed

## 2025-05-28 RX ORDER — ALLOPURINOL 300 MG/1
300 TABLET ORAL DAILY
Qty: 90 TABLET | Refills: 3 | Status: SHIPPED | OUTPATIENT
Start: 2025-05-28

## 2025-05-28 RX ORDER — LISINOPRIL 10 MG/1
10 TABLET ORAL DAILY
Qty: 90 TABLET | Refills: 3 | Status: SHIPPED | OUTPATIENT
Start: 2025-05-28

## 2025-05-28 RX ORDER — GLIPIZIDE 5 MG/1
5 TABLET ORAL DAILY
Qty: 90 TABLET | Refills: 0 | Status: SHIPPED | OUTPATIENT
Start: 2025-05-28

## 2025-05-28 RX ORDER — METFORMIN HYDROCHLORIDE 500 MG/1
1500 TABLET, EXTENDED RELEASE ORAL
Qty: 270 TABLET | Refills: 0 | Status: SHIPPED | OUTPATIENT
Start: 2025-05-28

## 2025-05-28 RX ORDER — ATORVASTATIN CALCIUM 10 MG/1
10 TABLET, FILM COATED ORAL DAILY
Qty: 90 TABLET | Refills: 3 | Status: SHIPPED | OUTPATIENT
Start: 2025-05-28

## 2025-05-29 NOTE — TELEPHONE ENCOUNTER
Mandarin ID#: [id number].  Attempt #2.   Writer called and left message on patient's voicemail to call back and speak with a triage nurse.  Called CTC number. Unable to leave message.     CHETAN BojorquezN RN  [facility]    Staff -- please mail results to patient.     Message from Dr. Kennedy Tate:  Your recent lipid panel shows mildly low HDL but otherwise is at goal. CMP and uric acid levels are normal. A1c is at goal as we discussed in office. Refill sent to pharmacy for your medications. Please follow-up in 6 months for diabetes follow-up.

## 2025-07-21 ENCOUNTER — OFFICE VISIT (OUTPATIENT)
Dept: URGENT CARE | Facility: URGENT CARE | Age: 55
End: 2025-07-21
Payer: COMMERCIAL

## 2025-07-21 ENCOUNTER — ANCILLARY PROCEDURE (OUTPATIENT)
Dept: GENERAL RADIOLOGY | Facility: CLINIC | Age: 55
End: 2025-07-21
Attending: PHYSICIAN ASSISTANT
Payer: COMMERCIAL

## 2025-07-21 VITALS
HEIGHT: 69 IN | WEIGHT: 181 LBS | TEMPERATURE: 98.7 F | DIASTOLIC BLOOD PRESSURE: 75 MMHG | RESPIRATION RATE: 16 BRPM | BODY MASS INDEX: 26.81 KG/M2 | HEART RATE: 71 BPM | OXYGEN SATURATION: 99 % | SYSTOLIC BLOOD PRESSURE: 120 MMHG

## 2025-07-21 DIAGNOSIS — M25.511 ACUTE PAIN OF RIGHT SHOULDER: Primary | ICD-10-CM

## 2025-07-21 DIAGNOSIS — M19.019 DJD OF AC (ACROMIOCLAVICULAR) JOINT: ICD-10-CM

## 2025-07-21 PROCEDURE — 73030 X-RAY EXAM OF SHOULDER: CPT | Mod: TC | Performed by: RADIOLOGY

## 2025-07-21 PROCEDURE — 99213 OFFICE O/P EST LOW 20 MIN: CPT | Performed by: PHYSICIAN ASSISTANT

## 2025-07-21 RX ORDER — MELOXICAM 15 MG/1
7.5 TABLET ORAL DAILY
Qty: 30 TABLET | Refills: 0 | Status: SHIPPED | OUTPATIENT
Start: 2025-07-21

## 2025-07-21 NOTE — PROGRESS NOTES
"SUBJECTIVE:   Lisa Cazares is a 55 year old male presenting with a chief complaint of   Chief Complaint   Patient presents with    Urgent Care     Pain and numbness in right arm and leg x 2 months. Numbness started in past week. Unable to sleep on right side.        He is an established patient of [place].  Through , Patient presents with pain and numbness to right arm and leg x 2 months.  Patient has had the numbness to all of the digits on the right hand for about 1 week he is concerned that he is having a stroke he also states that he has a lot of pain lifting his right arm he also has complaints of right lateral aspect of his knee for approximately 1 month        Review of Systems    Past Medical History:   Diagnosis Date    Appendicitis     \"In [place]- still has appendix\"    Diabetes (H)     Type II    Gout     High cholesterol     Hypertension      Family History   Problem Relation Age of Onset    Diabetes Mother     Colon Cancer Father     Diabetes Sister     Glaucoma No family hx of     Macular Degeneration No family hx of      Current Outpatient Medications   Medication Sig Dispense Refill    allopurinol (ZYLOPRIM) 300 MG tablet Take 1 tablet (300 mg) by mouth daily. 90 tablet 3    atorvastatin (LIPITOR) 10 MG tablet Take 1 tablet (10 mg) by mouth daily. 90 tablet 3    ciclopirox (PENLAC) 8 % external solution Apply to adjacent skin and affected nails daily.  Remove with alcohol every 7 days, then repeat. 6.6 mL 11    glipiZIDE (GLUCOTROL) 5 MG tablet Take 1 tablet (5 mg) by mouth daily. 90 tablet 0    indomethacin (INDOCIN) 50 MG capsule Take 1 capsule (50 mg) by mouth 2 times daily (with meals). As needed for toe pain. Use 1-2 weeks. 30 capsule 0    lisinopril (ZESTRIL) 10 MG tablet Take 1 tablet (10 mg) by mouth daily. 90 tablet 3    metFORMIN (GLUCOPHAGE XR) 500 MG 24 hr tablet Take 3 tablets (1,500 mg) by mouth daily (with dinner). 270 tablet 0    dicyclomine (BENTYL) 20 MG tablet Take 1 " "tablet (20 mg) by mouth 4 times daily as needed (abdominal pain) (Patient not taking: Reported on 7/21/2025) 40 tablet 0    vitamin E 400 units TABS Take 400 Units by mouth daily. (Patient not taking: Reported on 7/21/2025)       Social History     Tobacco Use    Smoking status: Never    Smokeless tobacco: Never   Substance Use Topics    Alcohol use: Not Currently     Comment: \"not since 2014 in [place]\"       OBJECTIVE  /75 (BP Location: Right arm, Patient Position: Sitting, Cuff Size: Adult Large)   Pulse 71   Temp 98.7  F (37.1  C) (Oral)   Resp 16   Ht 1.746 m (5' 8.75\")   Wt 82.1 kg (181 lb)   SpO2 99%   BMI 26.92 kg/m      Physical Exam  Vitals reviewed.   Constitutional:       Appearance: Normal appearance. He is normal weight.   Cardiovascular:      Rate and Rhythm: Normal rate.   Musculoskeletal:      Comments: Right shoulder exam Limited internal and external rotation he has he is unable to have full flexion he has pain with passive range of motion on flexion radial pulses palpated elbow exam is unremarkable and cap refill on his right hand is less than 2 seconds   Skin:     General: Skin is warm and dry.   Neurological:      General: No focal deficit present.      Mental Status: He is alert.         Labs:  No results found for this or any previous visit (from the past 24 hours).    X-Ray was done, my findings are: Xrays reviewed by myself and independently interpreted.  Any significant discrepancies with official radiologic read, patient will be notified.        AC djd    ASSESSMENT:    No diagnosis found.     Medical Decision Making:    Differential Diagnosis:  MS Injury Pain: Calcific tendinitis rotator cuff tear or tendinitis degenerative arthritis    Serious Comorbid Conditions:  Adult:  reviewed    PLAN:    Prescription for Mobic referral to Ortho.  No other anti-inflammatories may also take Tylenol.      Followup:    If not improving or if condition worsens, follow up with your Primary " Care Provider, If not improving or if conditions worsens over the next 12-24 hours, go to the Emergency Department    There are no Patient Instructions on file for this visit.

## 2025-07-22 ENCOUNTER — PATIENT OUTREACH (OUTPATIENT)
Dept: CARE COORDINATION | Facility: CLINIC | Age: 55
End: 2025-07-22
Payer: COMMERCIAL

## 2025-07-24 ENCOUNTER — PATIENT OUTREACH (OUTPATIENT)
Dept: CARE COORDINATION | Facility: CLINIC | Age: 55
End: 2025-07-24
Payer: COMMERCIAL

## 2025-08-23 DIAGNOSIS — E11.319 DIABETIC RETINOPATHY OF BOTH EYES WITHOUT MACULAR EDEMA ASSOCIATED WITH TYPE 2 DIABETES MELLITUS, UNSPECIFIED RETINOPATHY SEVERITY (H): ICD-10-CM

## 2025-08-24 RX ORDER — METFORMIN HYDROCHLORIDE 500 MG/1
1500 TABLET, EXTENDED RELEASE ORAL
Qty: 270 TABLET | Refills: 0 | Status: SHIPPED | OUTPATIENT
Start: 2025-08-24

## 2025-08-30 ENCOUNTER — HEALTH MAINTENANCE LETTER (OUTPATIENT)
Age: 55
End: 2025-08-30

## (undated) DEVICE — SOL WATER IRRIG 1000ML BOTTLE 2F7114

## (undated) DEVICE — DECANTER BAG 2002S

## (undated) RX ORDER — FENTANYL CITRATE 50 UG/ML
INJECTION, SOLUTION INTRAMUSCULAR; INTRAVENOUS
Status: DISPENSED
Start: 2024-09-10